# Patient Record
Sex: MALE | Race: WHITE | Employment: OTHER | URBAN - METROPOLITAN AREA
[De-identification: names, ages, dates, MRNs, and addresses within clinical notes are randomized per-mention and may not be internally consistent; named-entity substitution may affect disease eponyms.]

---

## 2017-09-18 DIAGNOSIS — I10 ESSENTIAL HYPERTENSION WITH GOAL BLOOD PRESSURE LESS THAN 140/90: ICD-10-CM

## 2017-09-18 NOTE — TELEPHONE ENCOUNTER
Clonidine      Last Written Prescription Date: 8/9/16  Last Fill Quantity: 180, # refills: 3  Last Office Visit with [facility], [facility] or [facility] prescribing provider: 8/9/16       Potassium   Date Value Ref Range Status   08/09/2016 4.2 3.4 - 5.3 mmol/L Final     Creatinine   Date Value Ref Range Status   08/09/2016 1.32 (H) 0.66 - 1.25 mg/dL Final     BP Readings from Last 3 Encounters:   08/09/16 132/84   09/22/15 134/88   02/03/15 132/84         Metoprolol      Last Written Prescription Date: 8/9/16  Last Fill Quantity: 180, # refills: 2    Last Office Visit with [facility], [facility] or [facility] prescribing provider:  8/9/16   Future Office Visit:        BP Readings from Last 3 Encounters:   08/09/16 132/84   09/22/15 134/88   02/03/15 132/84

## 2017-09-19 RX ORDER — CLONIDINE HYDROCHLORIDE 0.2 MG/1
TABLET ORAL
Qty: 60 TABLET | Refills: 0 | Status: SHIPPED | OUTPATIENT
Start: 2017-09-19 | End: 2017-11-01

## 2017-09-19 RX ORDER — METOPROLOL TARTRATE 100 MG
TABLET ORAL
Qty: 60 TABLET | Refills: 0 | Status: SHIPPED | OUTPATIENT
Start: 2017-09-19 | End: 2017-11-01

## 2017-09-19 NOTE — TELEPHONE ENCOUNTER
Prescription approved per [facility] Refill Protocol.    Doris Hernandez RN  [facility]

## 2017-10-06 ENCOUNTER — TELEPHONE (OUTPATIENT)
Dept: FAMILY MEDICINE | Facility: CLINIC | Age: 62
End: 2017-10-06

## 2017-10-27 DIAGNOSIS — N18.2 TYPE 2 DIABETES MELLITUS WITH STAGE 2 CHRONIC KIDNEY DISEASE (H): ICD-10-CM

## 2017-10-27 DIAGNOSIS — E11.22 TYPE 2 DIABETES MELLITUS WITH STAGE 2 CHRONIC KIDNEY DISEASE (H): ICD-10-CM

## 2017-10-27 NOTE — TELEPHONE ENCOUNTER
GlyBURIDE-MetFORMIN Oral Tablet 2.5-500 MG        Last Written Prescription Date:  8/9/16  Last Fill Quantity: 360,   # refills: 3  Future Office visit:    Next 5 appointments (look out 90 days)     Nov 09, 2017 10:00 AM CST   PHYSICAL with Vijay Villanueva MD   [facility] ([facility])    [address] [place] [id number] [phone]                   Routing refill request to provider for review/approval because:  Does not meet protocol criteria  LOV: 8/9/16

## 2017-10-30 RX ORDER — GLYBURIDE-METFORMIN HYDROCHLORIDE 2.5; 5 MG/1; MG/1
TABLET ORAL
Qty: 60 TABLET | Refills: 0 | Status: SHIPPED | OUTPATIENT
Start: 2017-10-30 | End: 2017-11-09

## 2017-10-30 NOTE — TELEPHONE ENCOUNTER
Medication is being filled for 1 time refill only due to:  Patient needs to be seen because it has been more than one year since last visit.     MAGALI JulienN RN  [facility]

## 2017-11-01 DIAGNOSIS — I10 ESSENTIAL HYPERTENSION WITH GOAL BLOOD PRESSURE LESS THAN 140/90: ICD-10-CM

## 2017-11-01 NOTE — TELEPHONE ENCOUNTER
Metoprolol Tartrate Oral Tablet 100 MG        Last Written Prescription Date:  9/19/17  Last Fill Quantity: 60,   # refills: 0  Future Office visit:    Next 5 appointments (look out 90 days)     Nov 09, 2017 10:00 AM CST   PHYSICAL with Vijay Villanueva MD   [facility]    [address] [place] [id number] [phone]                   Routing refill request to provider for review/approval because:  Does not meet protocol criteria  LOV: 8/9/16

## 2017-11-01 NOTE — TELEPHONE ENCOUNTER
Clonidine      Last Written Prescription Date:  9/19/17  Last Fill Quantity: 60,   # refills: 0  Future Office visit:    Next 5 appointments (look out 90 days)     Nov 09, 2017 10:00 AM CST   PHYSICAL with Vijay Villanueva MD   [facility] ([facility])    [address] [place] [id number] [phone]                   Routing refill request to provider for review/approval because:  Does not meet protocol criteria  LOV: 8/9/16        Valsartan      Last Written Prescription Date:  8/19/16  Last Fill Quantity: 180,   # refills: 3  Future Office visit:    Next 5 appointments (look out 90 days)     Nov 09, 2017 10:00 AM CST   PHYSICAL with Vijay Villanueva MD   [facility] ([facility])    [address] [place] [id number] [phone]                   Routing refill request to provider for review/approval because:  Does not meet protocol criteria  LOV: 8/9/16

## 2017-11-03 RX ORDER — CLONIDINE HYDROCHLORIDE 0.2 MG/1
0.2 TABLET ORAL 2 TIMES DAILY
Qty: 60 TABLET | Refills: 0 | Status: SHIPPED | OUTPATIENT
Start: 2017-11-03 | End: 2017-11-09

## 2017-11-03 RX ORDER — METOPROLOL TARTRATE 100 MG
TABLET ORAL
Qty: 60 TABLET | Refills: 0 | Status: SHIPPED | OUTPATIENT
Start: 2017-11-03 | End: 2017-11-09

## 2017-11-03 RX ORDER — VALSARTAN AND HYDROCHLOROTHIAZIDE 160; 12.5 MG/1; MG/1
TABLET, FILM COATED ORAL
Qty: 180 TABLET | Refills: 0 | Status: SHIPPED | OUTPATIENT
Start: 2017-11-03 | End: 2017-11-09

## 2017-11-03 NOTE — TELEPHONE ENCOUNTER
Prescription approved per [facility] Refill Protocol.    Liza Han RN   [facility]

## 2017-11-03 NOTE — TELEPHONE ENCOUNTER
Prescription approved per [facility] Refill Protocol.    Liza Han RN   [facility]

## 2017-11-08 NOTE — PROGRESS NOTES
SUBJECTIVE:   CC: Juan Castillo Jr. is an 62 year old male who presents for preventative health visit.     Healthy Habits:    Do you get at least three servings of calcium containing foods daily (dairy, green leafy vegetables, etc.)? yes    Amount of exercise or daily activities, outside of work: 3 day(s) per week    Problems taking medications regularly No    Medication side effects: No    Have you had an eye exam in the past two years? yes    Do you see a dentist twice per year? No 1x    Do you have sleep apnea, excessive snoring or daytime drowsiness?no          Diabetes Follow-up      Patient is checking blood sugars: normal    Diabetic concerns: None     Symptoms of hypoglycemia (low blood sugar): none     Paresthesias (numbness or burning in feet) or sores: No     Date of last diabetic eye exam: fall 2016    Hyperlipidemia Follow-Up      Rate your low fat/cholesterol diet?: good    Taking statin?  Yes, no muscle aches from statin    Other lipid medications/supplements?:  none    Hypertension Follow-up      Outpatient blood pressures are not being checked.    Low Salt Diet: no added salt          Today's PHQ-2 Score: PHQ-2 ( 1999 Pfizer) 11/9/2017 8/9/2016   Q1: Little interest or pleasure in doing things 0 0   Q2: Feeling down, depressed or hopeless 0 0   PHQ-2 Score 0 0       Abuse: Current or Past(Physical, Sexual or Emotional)- No  Do you feel safe in your environment - Yes    Social History   Substance Use Topics     Smoking status: Never Smoker     Smokeless tobacco: Never Used     Alcohol use No     The patient does not drink >3 drinks per day nor >7 drinks per week.    Last PSA:   PSA   Date Value Ref Range Status   07/19/2012 1.69 0 - 4 ug/L Final       Reviewed orders with patient. Reviewed health maintenance and updated orders accordingly - Yes  Labs reviewed in EPIC    Reviewed and updated as needed this visit by clinical staff  Allergies  Meds         Reviewed and updated as needed this visit  by Provider        Past Medical History:   Diagnosis Date     Backache, unspecified      Closed fracture of rib(s), unspecified      Essential hypertension, benign      Sebaceous cyst      Type II or unspecified type diabetes mellitus without mention of complication, not stated as uncontrolled      Unspecified sinusitis (chronic)         ROS:  C: NEGATIVE for fever, chills, change in weight  I: NEGATIVE for worrisome rashes, moles or lesions  E: NEGATIVE for vision changes or irritation  ENT: NEGATIVE for ear, mouth and throat problems  R: NEGATIVE for significant cough or SOB  CV: NEGATIVE for chest pain, palpitations or peripheral edema  GI: NEGATIVE for nausea, abdominal pain, heartburn, or change in bowel habits   male: negative for dysuria, hematuria, decreased urinary stream, erectile dysfunction, urethral discharge  MUSCULOSKELETAL:POSITIVE  for joint pain left ankle/ gout flare up  N: NEGATIVE for weakness, dizziness or paresthesias  P: NEGATIVE for changes in mood or affect    OBJECTIVE:   /66 (BP Location: Right arm, Patient Position: Chair, Cuff Size: Adult Large)  Pulse 87  Temp 96.8  F (36  C) (Oral)  Wt 249 lb 14.4 oz (113.4 kg)  SpO2 96%  BMI 35.35 kg/m2  EXAM:  GENERAL: alert, mild distress, over weight and fatigued  EYES: Eyes grossly normal to inspection, PERRL and conjunctivae and sclerae normal  HENT: ear canals and TM's normal, nose and mouth without ulcers or lesions  NECK: no adenopathy, no asymmetry, masses, or scars and thyroid normal to palpation  RESP: lungs clear to auscultation - no rales, rhonchi or wheezes  CV: regular rate and rhythm, normal S1 S2, no S3 or S4, no murmur, click or rub, no peripheral edema and peripheral pulses strong  ABDOMEN: soft, nontender, no hepatosplenomegaly, no masses and bowel sounds normal   (male): normal male genitalia without lesions or urethral discharge, no hernia  RECTAL (male): normal sphincter tone, no rectal masses and prostate 2+  enlarged, nontender  MS: normal muscle tone, arthritis of the left ankle and tenderness to palpation same  SKIN: no suspicious lesions or rashes  NEURO: Normal strength and tone, mentation intact and speech normal  LYMPH: no cervical, supraclavicular, axillary, or inguinal adenopathy  Diabetic foot exam: normal DP and PT pulses, no trophic changes or ulcerative lesions and normal sensory exam    ASSESSMENT/PLAN:   1. Routine general medical examination at a health care facility  Urged diet/ exercise    2. Essential hypertension with goal blood pressure less than 140/90  Well controlled   - Lipid panel reflex to direct LDL Fasting  - Albumin Random Urine Quantitative with Creat Ratio  - cloNIDine (CATAPRES) 0.2 MG tablet; Take 1 tablet (0.2 mg) by mouth 2 times daily  Dispense: 180 tablet; Refill: 3  - valsartan-hydrochlorothiazide (DIOVAN-HCT) 160-12.5 MG per tablet; Take 2 tablets by mouth once daily  Dispense: 180 tablet; Refill: 3  - metoprolol (LOPRESSOR) 100 MG tablet; TAKE ONE TABLET (100 mg) BY MOUTH TWICE DAILY  Dispense: 180 tablet; Refill: 3    3. Type 2 diabetes mellitus with stage 2 chronic kidney disease, without long-term current use of insulin (H)  Well controlled   - HEMOGLOBIN A1C  - Lipid panel reflex to direct LDL Fasting  - TSH WITH FREE T4 REFLEX  - glyBURIDE-metFORMIN (GLUCOVANCE) 2.5-500 MG per tablet; Take 2 tablets by mouth twice daily with meals  Dispense: 180 tablet; Refill: 3    4. Screen for colon cancer  Will do  - Fecal colorectal cancer screen FIT - Future (S+30); Future  - GASTROENTEROLOGY ADULT REF CONSULT ONLY    5. Need for hepatitis C screening test  Low risk  - Hepatitis C Screen Reflex to HCV RNA Quant and Genotype    6. Screening for diabetic peripheral neuropathy  normal   - FOOT EXAM  NO CHARGE [33280.114]    7. Need for influenza vaccination  done    8. Acute idiopathic gout, unspecified site  resume  - allopurinol (ZYLOPRIM) 100 MG tablet; Take 1.5 tablets (150 mg) by  "mouth daily  Dispense: 135 tablet; Refill: 3  - predniSONE (DELTASONE) 20 MG tablet; Take 1 tablet (20 mg) by mouth 2 times daily  Dispense: 10 tablet; Refill: 0    9. High cholesterol  recheck  - simvastatin (ZOCOR) 40 MG tablet; Take 1 tablet (40mg) by mouth at bedtime  Dispense: 90 tablet; Refill: 3    10. Need for prophylactic vaccination and inoculation against influenza  done  - FLU VAC, SPLIT VIRUS IM > 3 YO (QUADRIVALENT) [68654]  - Vaccine Administration, Initial [69448]    COUNSELING:  Reviewed preventive health counseling, as reflected in patient instructions       Vision screening       Hearing screening       Immunizations    Vaccinated for: Influenza           Consider Hep C screening for patients born between 1945 and 1965       Colon cancer screening       Prostate cancer screening           reports that he has never smoked. He has never used smokeless tobacco.      Estimated body mass index is 35.35 kg/(m^2) as calculated from the following:    Height as of 8/9/16: 5' 10.5\" (1.791 m).    Weight as of this encounter: 249 lb 14.4 oz (113.4 kg).   Weight management plan: Discussed healthy diet and exercise guidelines and patient will follow up in 12 months in clinic to re-evaluate.    Counseling Resources:  ATP IV Guidelines  Pooled Cohorts Equation Calculator  FRAX Risk Assessment  ICSI Preventive Guidelines  Dietary Guidelines for Americans, 2010  USDA's MyPlate  ASA Prophylaxis  Lung CA Screening    Vijay Villanueva MD  [facility]  Injectable Influenza Immunization Documentation    1.  Is the person to be vaccinated sick today?   No    2. Does the person to be vaccinated have an allergy to a component   of the vaccine?   No  Egg Allergy Algorithm Link    3. Has the person to be vaccinated ever had a serious reaction   to influenza vaccine in the past?   No    4. Has the person to be vaccinated ever had Guillain-Barré syndrome?   No    Form completed by Carolina Javier " CMA

## 2017-11-09 ENCOUNTER — TELEPHONE (OUTPATIENT)
Dept: FAMILY MEDICINE | Facility: CLINIC | Age: 62
End: 2017-11-09

## 2017-11-09 ENCOUNTER — OFFICE VISIT (OUTPATIENT)
Dept: FAMILY MEDICINE | Facility: CLINIC | Age: 62
End: 2017-11-09
Payer: COMMERCIAL

## 2017-11-09 VITALS
OXYGEN SATURATION: 96 % | BODY MASS INDEX: 35.35 KG/M2 | TEMPERATURE: 96.8 F | HEART RATE: 87 BPM | SYSTOLIC BLOOD PRESSURE: 100 MMHG | DIASTOLIC BLOOD PRESSURE: 66 MMHG | WEIGHT: 249.9 LBS

## 2017-11-09 DIAGNOSIS — Z23 NEED FOR PROPHYLACTIC VACCINATION AND INOCULATION AGAINST INFLUENZA: ICD-10-CM

## 2017-11-09 DIAGNOSIS — Z11.59 NEED FOR HEPATITIS C SCREENING TEST: ICD-10-CM

## 2017-11-09 DIAGNOSIS — E11.22 TYPE 2 DIABETES MELLITUS WITH STAGE 2 CHRONIC KIDNEY DISEASE, WITHOUT LONG-TERM CURRENT USE OF INSULIN (H): ICD-10-CM

## 2017-11-09 DIAGNOSIS — N18.2 TYPE 2 DIABETES MELLITUS WITH STAGE 2 CHRONIC KIDNEY DISEASE, WITHOUT LONG-TERM CURRENT USE OF INSULIN (H): ICD-10-CM

## 2017-11-09 DIAGNOSIS — Z12.11 SCREEN FOR COLON CANCER: ICD-10-CM

## 2017-11-09 DIAGNOSIS — I10 ESSENTIAL HYPERTENSION WITH GOAL BLOOD PRESSURE LESS THAN 140/90: ICD-10-CM

## 2017-11-09 DIAGNOSIS — E78.00 HIGH CHOLESTEROL: ICD-10-CM

## 2017-11-09 DIAGNOSIS — M10.00 ACUTE IDIOPATHIC GOUT, UNSPECIFIED SITE: ICD-10-CM

## 2017-11-09 DIAGNOSIS — Z23 NEED FOR INFLUENZA VACCINATION: ICD-10-CM

## 2017-11-09 DIAGNOSIS — Z00.00 ROUTINE GENERAL MEDICAL EXAMINATION AT A HEALTH CARE FACILITY: Primary | ICD-10-CM

## 2017-11-09 DIAGNOSIS — Z13.89 SCREENING FOR DIABETIC PERIPHERAL NEUROPATHY: ICD-10-CM

## 2017-11-09 LAB
CHOLEST SERPL-MCNC: 167 MG/DL
HBA1C MFR BLD: 7.9 % (ref 4.3–6)
HCV AB SERPL QL IA: NONREACTIVE
HDLC SERPL-MCNC: 28 MG/DL
LDLC SERPL CALC-MCNC: 93 MG/DL
NONHDLC SERPL-MCNC: 139 MG/DL
TRIGL SERPL-MCNC: 229 MG/DL
TSH SERPL DL<=0.005 MIU/L-ACNC: 2.68 MU/L (ref 0.4–4)

## 2017-11-09 PROCEDURE — 36415 COLL VENOUS BLD VENIPUNCTURE: CPT | Performed by: FAMILY MEDICINE

## 2017-11-09 PROCEDURE — 83036 HEMOGLOBIN GLYCOSYLATED A1C: CPT | Performed by: FAMILY MEDICINE

## 2017-11-09 PROCEDURE — 84443 ASSAY THYROID STIM HORMONE: CPT | Performed by: FAMILY MEDICINE

## 2017-11-09 PROCEDURE — 99396 PREV VISIT EST AGE 40-64: CPT | Mod: 25 | Performed by: FAMILY MEDICINE

## 2017-11-09 PROCEDURE — 90471 IMMUNIZATION ADMIN: CPT | Performed by: FAMILY MEDICINE

## 2017-11-09 PROCEDURE — 99207 C FOOT EXAM  NO CHARGE: CPT | Mod: 25 | Performed by: FAMILY MEDICINE

## 2017-11-09 PROCEDURE — 90686 IIV4 VACC NO PRSV 0.5 ML IM: CPT | Performed by: FAMILY MEDICINE

## 2017-11-09 PROCEDURE — 80061 LIPID PANEL: CPT | Performed by: FAMILY MEDICINE

## 2017-11-09 PROCEDURE — 86803 HEPATITIS C AB TEST: CPT | Performed by: FAMILY MEDICINE

## 2017-11-09 RX ORDER — GLYBURIDE-METFORMIN HYDROCHLORIDE 2.5; 5 MG/1; MG/1
TABLET ORAL
Qty: 180 TABLET | Refills: 3 | Status: SHIPPED | OUTPATIENT
Start: 2017-11-09 | End: 2017-11-09

## 2017-11-09 RX ORDER — PREDNISONE 20 MG/1
20 TABLET ORAL 2 TIMES DAILY
Qty: 10 TABLET | Refills: 0 | Status: SHIPPED | OUTPATIENT
Start: 2017-11-09 | End: 2018-12-20

## 2017-11-09 RX ORDER — SIMVASTATIN 40 MG
TABLET ORAL
Qty: 90 TABLET | Refills: 3 | Status: SHIPPED | OUTPATIENT
Start: 2017-11-09 | End: 2018-11-24

## 2017-11-09 RX ORDER — GLYBURIDE-METFORMIN HYDROCHLORIDE 2.5; 5 MG/1; MG/1
TABLET ORAL
Qty: 360 TABLET | Refills: 1 | Status: SHIPPED | OUTPATIENT
Start: 2017-11-09 | End: 2018-07-10

## 2017-11-09 RX ORDER — VALSARTAN AND HYDROCHLOROTHIAZIDE 160; 12.5 MG/1; MG/1
TABLET, FILM COATED ORAL
Qty: 180 TABLET | Refills: 3 | Status: SHIPPED | OUTPATIENT
Start: 2017-11-09 | End: 2019-01-17

## 2017-11-09 RX ORDER — METOPROLOL TARTRATE 100 MG
TABLET ORAL
Qty: 180 TABLET | Refills: 3 | Status: SHIPPED | OUTPATIENT
Start: 2017-11-09 | End: 2018-12-20

## 2017-11-09 RX ORDER — CLONIDINE HYDROCHLORIDE 0.2 MG/1
0.2 TABLET ORAL 2 TIMES DAILY
Qty: 180 TABLET | Refills: 3 | Status: SHIPPED | OUTPATIENT
Start: 2017-11-09 | End: 2018-11-24

## 2017-11-09 RX ORDER — ALLOPURINOL 100 MG/1
150 TABLET ORAL DAILY
Qty: 135 TABLET | Refills: 3 | Status: SHIPPED | OUTPATIENT
Start: 2017-11-09 | End: 2018-11-20

## 2017-11-09 NOTE — NURSING NOTE
"Chief Complaint   Patient presents with     Physical       Initial /66 (BP Location: Right arm, Patient Position: Chair, Cuff Size: Adult Large)  Pulse 87  Temp 96.8  F (36  C) (Oral)  Wt 249 lb 14.4 oz (113.4 kg)  SpO2 96%  BMI 35.35 kg/m2 Estimated body mass index is 35.35 kg/(m^2) as calculated from the following:    Height as of 8/9/16: 5' 10.5\" (1.791 m).    Weight as of this encounter: 249 lb 14.4 oz (113.4 kg).  Medication Reconciliation: complete     Carolina Javier CMA    "

## 2017-11-09 NOTE — MR AVS SNAPSHOT
After Visit Summary   11/9/2017    Juan Castillo Jr.    MRN: [id number]           Patient Information     Date Of Birth          1955        Visit Information        Provider Department      11/9/2017 10:00 AM Vijay Villanueva MD [facility]        Today's Diagnoses     Routine general medical examination at a health care facility    -  1    Essential hypertension with goal blood pressure less than 140/90        Type 2 diabetes mellitus with stage 2 chronic kidney disease, without long-term current use of insulin (H)        Screen for colon cancer        Need for hepatitis C screening test        Screening for diabetic peripheral neuropathy        Need for influenza vaccination        Acute idiopathic gout, unspecified site        High cholesterol        Need for prophylactic vaccination and inoculation against influenza          Care Instructions      Preventive Health Recommendations  Male Ages 50 - 64    Yearly exam:             See your health care provider every year in order to  o   Review health changes.   o   Discuss preventive care.    o   Review your medicines if your doctor has prescribed any.     Have a cholesterol test every 5 years, or more frequently if you are at risk for high cholesterol/heart disease.     Have a diabetes test (fasting glucose) every three years. If you are at risk for diabetes, you should have this test more often.     Have a colonoscopy at age 50, or have a yearly FIT test (stool test). These exams will check for colon cancer.      Talk with your health care provider about whether or not a prostate cancer screening test (PSA) is right for you.    You should be tested each year for STDs (sexually transmitted diseases), if you re at risk.     Shots: Get a flu shot each year. Get a tetanus shot every 10 years.     Nutrition:    Eat at least 5 servings of fruits and vegetables daily.     Eat whole-grain bread, whole-wheat pasta and brown  rice instead of white grains and rice.     Talk to your provider about Calcium and Vitamin D.     Lifestyle    Exercise for at least 150 minutes a week (30 minutes a day, 5 days a week). This will help you control your weight and prevent disease.     Limit alcohol to one drink per day.     No smoking.     Wear sunscreen to prevent skin cancer.     See your dentist every six months for an exam and cleaning.     See your eye doctor every 1 to 2 years.            Follow-ups after your visit        Additional Services     GASTROENTEROLOGY ADULT REF CONSULT ONLY       Preferred Location: [facility] [phone]      Please be aware that coverage of these services is subject to the terms and limitations of your health insurance plan.  Call member services at your health plan with any benefit or coverage questions.  Any procedures must be performed at a [place] facility OR coordinated by your clinic's referral office.    Please bring the following with you to your appointment:    (1) Any X-Rays, CTs or MRIs which have been performed.  Contact the facility where they were done to arrange for  prior to your scheduled appointment.    (2) List of current medications   (3) This referral request   (4) Any documents/labs given to you for this referral                  Future tests that were ordered for you today     Open Future Orders        Priority Expected Expires Ordered    Fecal colorectal cancer screen FIT - Future (S+30) Routine 11/29/2017 12/8/2017 11/9/2017            Who to contact     If you have questions or need follow up information about today's clinic visit or your schedule please contact [facility] directly at [phone].  Normal or non-critical lab and imaging results will be communicated to you by MyChart, letter or phone within 4 business days after the clinic has received the results. If you do not hear from us within 7 days, please contact the clinic through MyChart or phone. If you  "have a critical or abnormal lab result, we will notify you by phone as soon as possible.  Submit refill requests through [email or web address] or call your pharmacy and they will forward the refill request to us. Please allow 3 business days for your refill to be completed.          Additional Information About Your Visit        [email or web address] Information     [email or web address] lets you send messages to your doctor, view your test results, renew your prescriptions, schedule appointments and more. To sign up, go to [email or web address] . Click on \"Log in\" on the left side of the screen, which will take you to the Welcome page. Then click on \"Sign up Now\" on the right side of the page.     You will be asked to enter the access code listed below, as well as some personal information. Please follow the directions to create your username and password.     Your access code is: [email or web address]  Expires: 2018 12:25 PM     Your access code will  in 90 days. If you need help or a new code, please call your [place] clinic or [phone].        Care EveryWhere ID     This is your Care EveryWhere ID. This could be used by other organizations to access your [place] medical records  [id number]        Your Vitals Were     Pulse Temperature Pulse Oximetry BMI (Body Mass Index)          87 96.8  F (36  C) (Oral) 96% 35.35 kg/m2         Blood Pressure from Last 3 Encounters:   17 100/66   16 132/84   09/22/15 134/88    Weight from Last 3 Encounters:   17 249 lb 14.4 oz (113.4 kg)   16 246 lb 3.2 oz (111.7 kg)   09/22/15 271 lb (122.9 kg)              We Performed the Following     Albumin Random Urine Quantitative with Creat Ratio     FLU VAC, SPLIT VIRUS IM > 3 YO (QUADRIVALENT) [88674]     FOOT EXAM  NO CHARGE [66430.114]     GASTROENTEROLOGY ADULT REF CONSULT ONLY     HEMOGLOBIN A1C     Hepatitis C Screen Reflex to HCV RNA Quant and Genotype     Lipid panel reflex to direct LDL Fasting     TSH WITH FREE T4 REFLEX     " Vaccine Administration, Initial [91361]          Today's Medication Changes          These changes are accurate as of: 11/9/17 12:25 PM.  If you have any questions, ask your nurse or doctor.               Start taking these medicines.        Dose/Directions    predniSONE 20 MG tablet   Commonly known as:  DELTASONE   Used for:  Acute idiopathic gout, unspecified site   Started by:  Vijay Villanueva MD        Dose:  20 mg   Take 1 tablet (20 mg) by mouth 2 times daily   Quantity:  10 tablet   Refills:  0         These medicines have changed or have updated prescriptions.        Dose/Directions    glyBURIDE-metFORMIN 2.5-500 MG per tablet   Commonly known as:  GLUCOVANCE   This may have changed:  See the new instructions.   Used for:  Type 2 diabetes mellitus with stage 2 chronic kidney disease, without long-term current use of insulin (H)   Changed by:  Vijay Villanueva MD        Take 2 tablets by mouth twice daily with meals   Quantity:  180 tablet   Refills:  3       metoprolol 100 MG tablet   Commonly known as:  LOPRESSOR   This may have changed:  See the new instructions.   Used for:  Essential hypertension with goal blood pressure less than 140/90   Changed by:  Vijay Villanueva MD        TAKE ONE TABLET (100 mg) BY MOUTH TWICE DAILY   Quantity:  180 tablet   Refills:  3       simvastatin 40 MG tablet   Commonly known as:  ZOCOR   This may have changed:  See the new instructions.   Used for:  High cholesterol   Changed by:  Vijay Villanueva MD        Take 1 tablet (40mg) by mouth at bedtime   Quantity:  90 tablet   Refills:  3       valsartan-hydrochlorothiazide 160-12.5 MG per tablet   Commonly known as:  DIOVAN-HCT   This may have changed:  See the new instructions.   Used for:  Essential hypertension with goal blood pressure less than 140/90   Changed by:  Vijay Villanueva MD        Take 2 tablets by mouth once daily   Quantity:  180 tablet   Refills:  3             Where to get your medicines      These medications were sent to [id number] - [place], [place] - [address]  [address], [facility] [address]     Phone:  [phone]     allopurinol 100 MG tablet    cloNIDine 0.2 MG tablet    glyBURIDE-metFORMIN 2.5-500 MG per tablet    metoprolol 100 MG tablet    predniSONE 20 MG tablet    simvastatin 40 MG tablet    valsartan-hydrochlorothiazide 160-12.5 MG per tablet                Primary Care Provider Office Phone # Fax #    Vijay Villanueva [phone]       [address]  [facility] [address]        Equal Access to Services     CAITLYN BECERRA : Hadhayley delgadoo Sozhou, waaxda john, qaybta kaalmada adejaneen, robbi cummins. So [facility] [phone].    ATENCIÓN: Si habla español, tiene a rodgers disposición servicios gratuitos de asistencia lingüística. [facility] [phone].    We comply with applicable federal civil rights laws and [place] laws. We do not discriminate on the basis of race, color, national origin, age, disability, sex, sexual orientation, or gender identity.            Thank you!     Thank you for choosing [facility]  for your care. Our goal is always to provide you with excellent care. Hearing back from our patients is one way we can continue to improve our services. Please take a few minutes to complete the written survey that you may receive in the mail after your visit with us. Thank you!             Your Updated Medication List - Protect others around you: Learn how to safely use, store and throw away your medicines at www.disposemymeds.org.          This list is accurate as of: 11/9/17 12:25 PM.  Always use your most recent med list.                   Brand Name Dispense Instructions for use Diagnosis    allopurinol 100 MG tablet    ZYLOPRIM    135 tablet    Take 1.5 tablets (150 mg) by mouth daily    Acute idiopathic gout, unspecified site        aspirin 81 MG tablet      Take  by mouth daily.        blood glucose monitoring lancets     60 each    1 Device 2 times daily.    Type II or unspecified type diabetes mellitus without mention of complication, not stated as uncontrolled       blood glucose test strip     1 each    1 Device by In Vitro route 2 times daily.    Type II or unspecified type diabetes mellitus without mention of complication, not stated as uncontrolled       cloNIDine 0.2 MG tablet    CATAPRES    180 tablet    Take 1 tablet (0.2 mg) by mouth 2 times daily    Essential hypertension with goal blood pressure less than 140/90       DAILY MULTIVITAMIN PO       Hypertension goal BP (blood pressure) < 140/90, Type 2 diabetes, HbA1C goal < 8% (H), Hyperlipidemia LDL goal <100, Special screening for malignant neoplasms, colon       glyBURIDE-metFORMIN 2.5-500 MG per tablet    GLUCOVANCE    180 tablet    Take 2 tablets by mouth twice daily with meals    Type 2 diabetes mellitus with stage 2 chronic kidney disease, without long-term current use of insulin (H)       IRON SUPPLEMENT PO       Hypertension goal BP (blood pressure) < 140/90, Type 2 diabetes, HbA1C goal < 8% (H), Hyperlipidemia LDL goal <100, Special screening for malignant neoplasms, colon       metoprolol 100 MG tablet    LOPRESSOR    180 tablet    TAKE ONE TABLET (100 mg) BY MOUTH TWICE DAILY    Essential hypertension with goal blood pressure less than 140/90       predniSONE 20 MG tablet    DELTASONE    10 tablet    Take 1 tablet (20 mg) by mouth 2 times daily    Acute idiopathic gout, unspecified site       simvastatin 40 MG tablet    ZOCOR    90 tablet    Take 1 tablet (40mg) by mouth at bedtime    High cholesterol       terazosin 2 MG capsule    HYTRIN    270 capsule    1 tab in the AM and 2 tabs at HS.    Essential hypertension with goal blood pressure less than 140/90       valsartan-hydrochlorothiazide 160-12.5 MG per tablet    DIOVAN-HCT    180 tablet    Take 2 tablets by mouth  once daily    Essential hypertension with goal blood pressure less than 140/90

## 2017-11-09 NOTE — TELEPHONE ENCOUNTER
[facility] states the glyBURIDE-metFORMIN (GLUCOVANCE) 2.5-500 MG per tablet is prescribed for 45 days not 90 and is requesting a new prescription    Please contact pharmacy queued to resolve

## 2017-11-11 DIAGNOSIS — I10 ESSENTIAL HYPERTENSION WITH GOAL BLOOD PRESSURE LESS THAN 140/90: ICD-10-CM

## 2017-11-13 RX ORDER — TERAZOSIN 2 MG/1
CAPSULE ORAL
Qty: 270 CAPSULE | Refills: 2 | Status: SHIPPED | OUTPATIENT
Start: 2017-11-13 | End: 2018-12-20

## 2017-11-13 NOTE — TELEPHONE ENCOUNTER
Prescription approved per [facility] Ref[facility] Protocol.    Liza Han RN   [facility]

## 2018-07-10 DIAGNOSIS — E11.22 TYPE 2 DIABETES MELLITUS WITH STAGE 2 CHRONIC KIDNEY DISEASE, WITHOUT LONG-TERM CURRENT USE OF INSULIN (H): ICD-10-CM

## 2018-07-10 DIAGNOSIS — N18.2 TYPE 2 DIABETES MELLITUS WITH STAGE 2 CHRONIC KIDNEY DISEASE, WITHOUT LONG-TERM CURRENT USE OF INSULIN (H): ICD-10-CM

## 2018-07-10 RX ORDER — GLYBURIDE-METFORMIN HYDROCHLORIDE 2.5; 5 MG/1; MG/1
TABLET ORAL
Qty: 360 TABLET | Refills: 0 | Status: SHIPPED | OUTPATIENT
Start: 2018-07-10 | End: 2018-12-20

## 2018-07-10 NOTE — TELEPHONE ENCOUNTER
"Requested Prescriptions   Pending Prescriptions Disp Refills     glyBURIDE-metFORMIN (GLUCOVANCE) 2.5-500 MG per tablet [Pharmacy Med Name: GlyBURIDE-MetFORMIN Oral Tablet 2.5-500 MG] 360 tablet 0    Last Written Prescription Date:  11/09/2017  Last Fill Quantity: 360,  # refills: 1   Last office visit: 11/9/2017 with prescribing provider:  11/09/2017   Future Office Visit:     Sig: Take 2 tablets by mouth twice daily with meals    Combination Oral Antihyperglycemic Agents Failed    7/10/2018  7:52 AM       Failed - Patient has a documented Microalbumin level within past 12 mos.    Recent Labs   Lab Test  08/09/16   1027   MICROL  61   UMALCR  52.87*            Failed - Patient has documented A1c within the specified period of time.    If HgbA1C is 8 or greater, it needs to be on file within the past 3 months.  If less than 8, must be on file within the past 6 months.     Recent Labs   Lab Test  11/09/17   1038   A1C  7.9*            Failed - Recent (6 mo) or future (30 days) visit within the authorizing provider's specialty    Patient had office visit in the last 6 months or has a visit in the next 30 days with authorizing provider or within the authorizing provider's specialty.  See \"Patient Info\" tab in inbasket, or \"Choose Columns\" in Meds & Orders section of the refill encounter.           Passed - Blood pressure under 140/90 in past 12 months    BP Readings from Last 3 Encounters:   11/09/17 100/66   08/09/16 132/84   09/22/15 134/88                Passed - Patient has a documented LDL level within past 12 mos.    Recent Labs   Lab Test  11/09/17   1038   LDL  93            Passed - Patient's CR is NOT>1.4 OR Patient's EGFR is NOT<45 within past 12 mos.    Recent Labs   Lab Test  08/09/16   1027   GFRESTIMATED  55*   GFRESTBLACK  67       Recent Labs   Lab Test  08/09/16   1027   CR  1.32*            Passed - Patient does not have a diagnosis of CHF.       Passed - Patient is 18 years old or older.          "

## 2018-07-10 NOTE — TELEPHONE ENCOUNTER
Dr. Villanueva,    Please review/sign or advise for refill of glyburide-metformin (Glucovance) 2.5-500 mg tablet.    Routing because labs out of range for microalbumin/A1c.    Sherrie Tijerina RN  [facility]

## 2018-07-17 ENCOUNTER — TELEPHONE (OUTPATIENT)
Dept: FAMILY MEDICINE | Facility: CLINIC | Age: 63
End: 2018-07-17

## 2018-07-17 DIAGNOSIS — I10 ESSENTIAL HYPERTENSION WITH GOAL BLOOD PRESSURE LESS THAN 140/90: Primary | ICD-10-CM

## 2018-07-17 NOTE — TELEPHONE ENCOUNTER
Received fax from [facility]: They do not have Valsartan 160 mg tablet available due to recall. They are wondering if provider would like alternative prescription ordered. Fax at triage desk.    Sherrie Tijerina RN  [facility]

## 2018-07-18 RX ORDER — LOSARTAN POTASSIUM AND HYDROCHLOROTHIAZIDE 12.5; 1 MG/1; MG/1
1 TABLET ORAL DAILY
Qty: 90 TABLET | Refills: 1 | Status: SHIPPED | OUTPATIENT
Start: 2018-07-18 | End: 2018-12-20

## 2018-07-18 NOTE — TELEPHONE ENCOUNTER
Switch to   Orders Placed This Encounter     losartan-hydrochlorothiazide (HYZAAR) 100-12.5 MG per tablet     Sig: Take 1 tablet by mouth daily     Dispense:  90 tablet     Refill:  1

## 2018-07-18 NOTE — TELEPHONE ENCOUNTER
Dr. Villanueva    Pt valsartan is on recall, please send an alternative if needed    Pharm cued    Liza Han RN   [facility]

## 2018-07-18 NOTE — TELEPHONE ENCOUNTER
Unable to contact pt, does not have a working number    Liza Han RN   [facility]

## 2018-08-02 ENCOUNTER — TELEPHONE (OUTPATIENT)
Dept: FAMILY MEDICINE | Facility: CLINIC | Age: 63
End: 2018-08-02

## 2018-08-02 DIAGNOSIS — I10 ESSENTIAL HYPERTENSION WITH GOAL BLOOD PRESSURE LESS THAN 140/90: Primary | ICD-10-CM

## 2018-08-02 RX ORDER — CARVEDILOL 25 MG/1
25 TABLET ORAL 2 TIMES DAILY WITH MEALS
Qty: 60 TABLET | Refills: 0 | Status: SHIPPED | OUTPATIENT
Start: 2018-08-02 | End: 2018-09-23

## 2018-08-02 NOTE — TELEPHONE ENCOUNTER
Called patient. Notified of prescription sent and provider message. Pt verbalized understanding.    Sherrie Tijerina RN  [facility]

## 2018-08-02 NOTE — TELEPHONE ENCOUNTER
Please call patient  I can ok a 1 month supply but I absolutely have to see him within a month or will not be able to refill anything as it would be medically inappropriate to do so

## 2018-08-02 NOTE — TELEPHONE ENCOUNTER
Pharmacy is sending in a medication request for Carvedilol 25 mg tablet, take 1 tab by mouth twice daily for high blood pressure    Medication not on pt's medication list at all as a current med, inactive med, or discontinued med.

## 2018-08-02 NOTE — TELEPHONE ENCOUNTER
Dr. Villanueva,    Patient is requesting a refill of carvedilol (Coreg) 25 mg tablet. BID    Last filled through Dr. Elier Curry at [facility]. Encounter date: 06/10/18-06/12-18. Indication: High blood pressure. Pt declined scheduling follow up, Stated that he has family issues going on right now with his father.     Home phone number: [phone]    Sherrie Tijerina RN  [facility]

## 2018-09-23 DIAGNOSIS — I10 ESSENTIAL HYPERTENSION WITH GOAL BLOOD PRESSURE LESS THAN 140/90: ICD-10-CM

## 2018-09-24 RX ORDER — CARVEDILOL 25 MG/1
TABLET ORAL
Qty: 30 TABLET | Refills: 0 | Status: SHIPPED | OUTPATIENT
Start: 2018-09-24 | End: 2018-10-22

## 2018-09-24 NOTE — TELEPHONE ENCOUNTER
Pt needs to make appointment for further refills  Dayana fill given    Liza Han RN   [facility]

## 2018-09-24 NOTE — TELEPHONE ENCOUNTER
"Requested Prescriptions   Pending Prescriptions Disp Refills     carvedilol (COREG) 25 MG tablet [Pharmacy Med Name: Carvedilol Oral Tablet 25 MG] 60 tablet 0    Last Written Prescription Date:  08/02/2018  Last Fill Quantity: 60,  # refills: 0   Last office visit: 11/9/2017 with prescribing provider:  11/09/2017   Future Office Visit:   Sig: Take 1 tablet (25 mg) by mouth twice daily (with meals)    Beta-Blockers Protocol Passed    9/23/2018  8:34 AM       Passed - Blood pressure under 140/90 in past 12 months    BP Readings from Last 3 Encounters:   11/09/17 100/66   08/09/16 132/84   09/22/15 134/88                Passed - Patient is age 6 or older       Passed - Recent (12 mo) or future (30 days) visit within the authorizing provider's specialty    Patient had office visit in the last 12 months or has a visit in the next 30 days with authorizing provider or within the authorizing provider's specialty.  See \"Patient Info\" tab in inbasket, or \"Choose Columns\" in Meds & Orders section of the refill encounter.            "

## 2018-09-24 NOTE — TELEPHONE ENCOUNTER
Routing refill request to provider for review/approval because:  You advised the following at last refill - patient has not follow up - no future appointments - called patient -unable to reach - left message advising - please schedule office visit to discuss refills of this medication    Vijay Villanueva MD        8/2/18 10:58 AM   Note      Please call patient  I can ok a 1 month supply but I absolutely have to see him within a month or will not be able to refill anything as it would be medically inappropriate to do so            Thank you,  Frances Tadeo RN

## 2018-10-01 ENCOUNTER — TRANSFERRED RECORDS (OUTPATIENT)
Dept: HEALTH INFORMATION MANAGEMENT | Facility: CLINIC | Age: 63
End: 2018-10-01

## 2018-10-22 DIAGNOSIS — I10 ESSENTIAL HYPERTENSION WITH GOAL BLOOD PRESSURE LESS THAN 140/90: ICD-10-CM

## 2018-10-22 RX ORDER — CARVEDILOL 25 MG/1
TABLET ORAL
Qty: 60 TABLET | Refills: 0 | Status: SHIPPED | OUTPATIENT
Start: 2018-10-22 | End: 2018-12-20

## 2018-10-22 NOTE — TELEPHONE ENCOUNTER
Reason for call:  Medication   If this is a refill request, has the caller requested the refill from the pharmacy already? Yes  Will the patient be using a [place] [facility]? No  Name of the pharmacy and phone number for the current request: [facility] ([phone])    Name of the medication requested: Carvedilol    Other request:     Phone number to reach patient:  [phone]         Best Time:  Anytime    Can we leave a detailed message on this number?  YES

## 2018-10-22 NOTE — TELEPHONE ENCOUNTER
Called patient. LVM to call clinic. Needs appt. Per TE 08/02/18 pt to f/u with PCP before further refills. Dayana given on 09/24/18.    Sherrie Tijerina RN  [facility]

## 2018-10-22 NOTE — TELEPHONE ENCOUNTER
Dr. Villanueva/Provider Pool:    Pt called clinic and stated that he needs a prescription sent to [facility] pharmacy for carvedilol.     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 Pt stated that he will be there for 3 more weeks and there is no provider there that can prescribe any prescriptions for him.    Medication/pharmacy cued. Please review/sign or advise.    Sherrie Tijerina RN  [facility]

## 2018-11-20 DIAGNOSIS — M10.00 ACUTE IDIOPATHIC GOUT, UNSPECIFIED SITE: ICD-10-CM

## 2018-11-20 NOTE — TELEPHONE ENCOUNTER
Called patient. Due for office visit and updated blood work  LOV: 11/09/2017    Cari Lester RN  Triage Nurse

## 2018-11-20 NOTE — TELEPHONE ENCOUNTER
"Requested Prescriptions   Pending Prescriptions Disp Refills     allopurinol (ZYLOPRIM) 100 MG tablet [Pharmacy Med Name: Allopurinol Oral Tablet 100 MG] 135 tablet 2    Last Written Prescription Date:  11/09/17  Last Fill Quantity: 135,  # refills: 3   Last office visit: 11/9/2017 with prescribing provider:  11/09/17   Future Office Visit:     Sig: TAKE 1 AND 1/2 TABLETS (150 MG) BY MOUTH DAILY    Gout Agents Protocol Failed    11/20/2018 12:53 PM       Failed - CBC on file in past 12 months    Recent Labs   Lab Test  09/22/14   1002   WBC  6.5   RBC  3.71*   HGB  11.7*   HCT  36.1*   PLT  165                Failed - ALT on file in past 12 months    Recent Labs   Lab Test  08/09/16   1027   ALT  25            Failed - Has Uric Acid on file in past 12 months and value is less than 6    No lab results found.  If level is 6mg/dL or greater, ok to refill one time and refer to provider.          Failed - Recent (12 mo) or future (30 days) visit within the authorizing provider's specialty    Patient had office visit in the last 12 months or has a visit in the next 30 days with authorizing provider or within the authorizing provider's specialty.  See \"Patient Info\" tab in inbasket, or \"Choose Columns\" in Meds & Orders section of the refill encounter.             Failed - Normal serum creatinine on file in the past 12 months    Recent Labs   Lab Test  08/09/16   1027   CR  1.32*            Passed - Patient is age 18 or older          "

## 2018-11-21 RX ORDER — ALLOPURINOL 100 MG/1
TABLET ORAL
Qty: 20 TABLET | Refills: 0 | Status: SHIPPED | OUTPATIENT
Start: 2018-11-21 | End: 2019-01-16

## 2018-11-21 NOTE — TELEPHONE ENCOUNTER
To covering provider.    Overdue for labs,    Can you sign for 30 days or does he need to be seen first?    Danyell Tarango, RN, BSN

## 2018-11-24 DIAGNOSIS — E78.00 HIGH CHOLESTEROL: ICD-10-CM

## 2018-11-24 DIAGNOSIS — I10 ESSENTIAL HYPERTENSION WITH GOAL BLOOD PRESSURE LESS THAN 140/90: ICD-10-CM

## 2018-11-26 NOTE — TELEPHONE ENCOUNTER
Called patient. Left voicemail to return call to clinic.    LOV: 11/09/2017    Cari Lester, ESMER  Triage Nurse

## 2018-11-27 NOTE — TELEPHONE ENCOUNTER
"Requested Prescriptions   Pending Prescriptions Disp Refills     carvedilol (COREG) 25 MG tablet [Pharmacy Med Name: Carvedilol Oral Tablet 25 MG] 30 tablet 0     Sig: TAKE ONE TABLET BY MOUTH TWICE DAILY with meals - need MD appt-    Beta-Blockers Protocol Failed    11/26/2018 12:36 PM       Failed - Blood pressure under 140/90 in past 12 months    BP Readings from Last 3 Encounters:   11/09/17 100/66 08/09/16 132/84   09/22/15 134/88                Failed - Recent (12 mo) or future (30 days) visit within the authorizing provider's specialty    Patient had office visit in the last 12 months or has a visit in the next 30 days with authorizing provider or within the authorizing provider's specialty.  See \"Patient Info\" tab in inbasket, or \"Choose Columns\" in Meds & Orders section of the refill encounter.             Passed - Patient is age 6 or older        cloNIDine (CATAPRES) 0.2 MG tablet [Pharmacy Med Name: CloNIDine HCl Oral Tablet 0.2 MG] 180 tablet 2     Sig: TAKE ONE TABLET (0.2 MG) BY MOUTH TWICE DAILY    Central Acting Antiadrenergic Agents Failed    11/26/2018 12:36 PM       Failed - Blood pressure under 140/90 in past 12 months    BP Readings from Last 3 Encounters:   11/09/17 100/66 08/09/16 132/84   09/22/15 134/88                Failed - Recent (12 mo) or future (30 days) visit within the authorizing provider's specialty    Patient had office visit in the last 12 months or has a visit in the next 30 days with authorizing provider or within the authorizing provider's specialty.  See \"Patient Info\" tab in inbasket, or \"Choose Columns\" in Meds & Orders section of the refill encounter.             Failed - Normal serum creatinine on file within past 12 months    Recent Labs   Lab Test  08/09/16   1027   CR  1.32*            Passed - Patient is 6 years of age or older        simvastatin (ZOCOR) 40 MG tablet [Pharmacy Med Name: Simvastatin Oral Tablet 40 MG] 90 tablet 2     Sig: TAKE 1 TABLET (40 mg) BY " "MOUTH EVERY NIGHT AT BEDTIME.    Statins Protocol Failed    11/26/2018 12:36 PM       Failed - LDL on file in past 12 months    Recent Labs   Lab Test  11/09/17   1038   LDL  93            Failed - Recent (12 mo) or future (30 days) visit within the authorizing provider's specialty    Patient had office visit in the last 12 months or has a visit in the next 30 days with authorizing provider or within the authorizing provider's specialty.  See \"Patient Info\" tab in inbasket, or \"Choose Columns\" in Meds & Orders section of the refill encounter.             Passed - No abnormal creatine kinase in past 12 months    No lab results found.            Passed - Patient is age 18 or older        "

## 2018-11-28 ENCOUNTER — TELEPHONE (OUTPATIENT)
Dept: FAMILY MEDICINE | Facility: CLINIC | Age: 63
End: 2018-11-28

## 2018-11-28 DIAGNOSIS — I10 ESSENTIAL HYPERTENSION WITH GOAL BLOOD PRESSURE LESS THAN 140/90: ICD-10-CM

## 2018-11-28 RX ORDER — SIMVASTATIN 40 MG
TABLET ORAL
Qty: 30 TABLET | Refills: 0 | Status: SHIPPED | OUTPATIENT
Start: 2018-11-28 | End: 2018-12-20

## 2018-11-28 RX ORDER — CARVEDILOL 25 MG/1
TABLET ORAL
Qty: 30 TABLET | Refills: 0 | Status: SHIPPED | OUTPATIENT
Start: 2018-11-28 | End: 2018-11-28

## 2018-11-28 RX ORDER — CARVEDILOL 25 MG/1
TABLET ORAL
Qty: 60 TABLET | Refills: 0 | Status: SHIPPED | OUTPATIENT
Start: 2018-11-28 | End: 2018-12-20

## 2018-11-28 RX ORDER — CLONIDINE HYDROCHLORIDE 0.2 MG/1
TABLET ORAL
Qty: 60 TABLET | Refills: 0 | Status: SHIPPED | OUTPATIENT
Start: 2018-11-28 | End: 2018-12-20

## 2018-11-28 NOTE — TELEPHONE ENCOUNTER
Per chart review, pt has appt scheduled with Kay for 12/07/18.    Medication is being filled for 1 time refill only due to:  Patient needs to be seen because due for appointment.     Sherrie Tijerina RN  [facility]

## 2018-11-28 NOTE — TELEPHONE ENCOUNTER
Medication is being filled for 1 time refill only due to:  Patient needs to be seen because due for appt.    Sherrie Tijerina RN  [facility]

## 2018-11-28 NOTE — TELEPHONE ENCOUNTER
carvedilol (COREG) 25 MG tablet is prescribed with twice a day and only 30 tabs for a months quantity, pharmacy requesting new prescription with corrected orders or quanity.    Pharmacy queued

## 2018-12-10 DIAGNOSIS — N18.2 TYPE 2 DIABETES MELLITUS WITH STAGE 2 CHRONIC KIDNEY DISEASE, WITHOUT LONG-TERM CURRENT USE OF INSULIN (H): ICD-10-CM

## 2018-12-10 DIAGNOSIS — E11.22 TYPE 2 DIABETES MELLITUS WITH STAGE 2 CHRONIC KIDNEY DISEASE, WITHOUT LONG-TERM CURRENT USE OF INSULIN (H): ICD-10-CM

## 2018-12-11 NOTE — TELEPHONE ENCOUNTER
"Requested Prescriptions   Pending Prescriptions Disp Refills     glyBURIDE-metFORMIN (GLUCOVANCE) 2.5-500 MG tablet [Pharmacy Med Name: GlyBURIDE-MetFORMIN Oral Tablet 2.5-500 MG] 360 tablet 0     Sig: Take 2 tablets by mouth twice daily with meals    Combination Oral Antihyperglycemic Agents Failed - 12/10/2018  3:52 PM       Failed - Blood pressure under 140/90 in past 12 months    BP Readings from Last 3 Encounters:   11/09/17 100/66   08/09/16 132/84   09/22/15 134/88                Failed - Patient has a documented LDL level within past 12 mos.    Recent Labs   Lab Test 11/09/17  1038   LDL 93            Failed - Patient has a documented Microalbumin level within past 12 mos.    Recent Labs   Lab Test 08/09/16  1027   MICROL 61   UMALCR 52.87*            Failed - Patient has documented A1c within the specified period of time.    If HgbA1C is 8 or greater, it needs to be on file within the past 3 months.  If less than 8, must be on file within the past 6 months.     Recent Labs   Lab Test 11/09/17  1038   A1C 7.9*            Failed - Recent (6 mo) or future (30 days) visit within the authorizing provider's specialty    Patient had office visit in the last 6 months or has a visit in the next 30 days with authorizing provider or within the authorizing provider's specialty.  See \"Patient Info\" tab in inbasket, or \"Choose Columns\" in Meds & Orders section of the refill encounter.           Passed - Patient's CR is NOT>1.4 OR Patient's EGFR is NOT<45 within past 12 mos.    Recent Labs   Lab Test 08/09/16  1027   GFRESTIMATED 55*   GFRESTBLACK 67       Recent Labs   Lab Test 08/09/16  1027   CR 1.32*            Passed - Patient does not have a diagnosis of CHF.       Passed - Patient is 18 years old or older.        "

## 2018-12-11 NOTE — TELEPHONE ENCOUNTER
Called patient. LVM to call clinic. Pt was a no show to LOV 12/07/18. Pt told that he needed to be seen within 10 days from refill encounter 11/20/18.    Sherrie Tijerina RN  [facility]

## 2018-12-13 RX ORDER — GLYBURIDE-METFORMIN HYDROCHLORIDE 2.5; 5 MG/1; MG/1
TABLET ORAL
Qty: 360 TABLET | Refills: 0 | OUTPATIENT
Start: 2018-12-13

## 2018-12-13 NOTE — TELEPHONE ENCOUNTER
Dr. Villanueva,    Please review/sign or advise for refill request of glyBURIDE-metFORMIN (GLUCOVANCE) 2.5-500 MG tablet    Routing refill request to provider for review/approval because:  Last refill encounter, 11/20/2018 patient instructed to be seen within 10 days. Patient was a no show to scheduled appointment.     Called patient. Notified of need for appointment. Appointment scheduled 12/20/2018.     Thank You!  Cari Lester, RN  Triage Nurse

## 2018-12-20 ENCOUNTER — OFFICE VISIT (OUTPATIENT)
Dept: FAMILY MEDICINE | Facility: CLINIC | Age: 63
End: 2018-12-20
Payer: COMMERCIAL

## 2018-12-20 VITALS
DIASTOLIC BLOOD PRESSURE: 87 MMHG | OXYGEN SATURATION: 95 % | HEART RATE: 116 BPM | TEMPERATURE: 98.7 F | WEIGHT: 244.7 LBS | SYSTOLIC BLOOD PRESSURE: 136 MMHG | BODY MASS INDEX: 34.61 KG/M2

## 2018-12-20 DIAGNOSIS — N18.2 TYPE 2 DIABETES MELLITUS WITH STAGE 2 CHRONIC KIDNEY DISEASE, WITHOUT LONG-TERM CURRENT USE OF INSULIN (H): Primary | ICD-10-CM

## 2018-12-20 DIAGNOSIS — Z13.89 SCREENING FOR DIABETIC PERIPHERAL NEUROPATHY: ICD-10-CM

## 2018-12-20 DIAGNOSIS — E11.22 TYPE 2 DIABETES MELLITUS WITH STAGE 2 CHRONIC KIDNEY DISEASE, WITHOUT LONG-TERM CURRENT USE OF INSULIN (H): Primary | ICD-10-CM

## 2018-12-20 DIAGNOSIS — M10.00 IDIOPATHIC GOUT, UNSPECIFIED CHRONICITY, UNSPECIFIED SITE: ICD-10-CM

## 2018-12-20 DIAGNOSIS — I10 ESSENTIAL HYPERTENSION WITH GOAL BLOOD PRESSURE LESS THAN 140/90: ICD-10-CM

## 2018-12-20 DIAGNOSIS — E78.00 HIGH CHOLESTEROL: ICD-10-CM

## 2018-12-20 DIAGNOSIS — Z11.4 ENCOUNTER FOR SCREENING FOR HIV: ICD-10-CM

## 2018-12-20 DIAGNOSIS — Z23 NEED FOR IMMUNIZATION AGAINST INFLUENZA: ICD-10-CM

## 2018-12-20 LAB — HBA1C MFR BLD: 12.6 % (ref 0–5.6)

## 2018-12-20 PROCEDURE — 87389 HIV-1 AG W/HIV-1&-2 AB AG IA: CPT | Performed by: FAMILY MEDICINE

## 2018-12-20 PROCEDURE — 90471 IMMUNIZATION ADMIN: CPT | Performed by: FAMILY MEDICINE

## 2018-12-20 PROCEDURE — 80053 COMPREHEN METABOLIC PANEL: CPT | Performed by: FAMILY MEDICINE

## 2018-12-20 PROCEDURE — 90686 IIV4 VACC NO PRSV 0.5 ML IM: CPT | Performed by: FAMILY MEDICINE

## 2018-12-20 PROCEDURE — 80061 LIPID PANEL: CPT | Performed by: FAMILY MEDICINE

## 2018-12-20 PROCEDURE — 99214 OFFICE O/P EST MOD 30 MIN: CPT | Mod: 25 | Performed by: FAMILY MEDICINE

## 2018-12-20 PROCEDURE — 84550 ASSAY OF BLOOD/URIC ACID: CPT | Performed by: FAMILY MEDICINE

## 2018-12-20 PROCEDURE — 36415 COLL VENOUS BLD VENIPUNCTURE: CPT | Performed by: FAMILY MEDICINE

## 2018-12-20 PROCEDURE — 83036 HEMOGLOBIN GLYCOSYLATED A1C: CPT | Performed by: FAMILY MEDICINE

## 2018-12-20 PROCEDURE — 99207 C FOOT EXAM  NO CHARGE: CPT | Performed by: FAMILY MEDICINE

## 2018-12-20 RX ORDER — CLONIDINE HYDROCHLORIDE 0.2 MG/1
TABLET ORAL
Qty: 180 TABLET | Refills: 3 | Status: SHIPPED | OUTPATIENT
Start: 2018-12-20

## 2018-12-20 RX ORDER — SIMVASTATIN 40 MG
TABLET ORAL
Qty: 90 TABLET | Refills: 3 | Status: SHIPPED | OUTPATIENT
Start: 2018-12-20

## 2018-12-20 RX ORDER — LOSARTAN POTASSIUM AND HYDROCHLOROTHIAZIDE 12.5; 1 MG/1; MG/1
1 TABLET ORAL DAILY
Qty: 90 TABLET | Refills: 3 | Status: SHIPPED | OUTPATIENT
Start: 2018-12-20

## 2018-12-20 RX ORDER — GLYBURIDE-METFORMIN HYDROCHLORIDE 2.5; 5 MG/1; MG/1
TABLET ORAL
Qty: 360 TABLET | Refills: 3 | Status: SHIPPED | OUTPATIENT
Start: 2018-12-20 | End: 2019-01-17 | Stop reason: ALTCHOICE

## 2018-12-20 RX ORDER — CARVEDILOL 25 MG/1
TABLET ORAL
Qty: 180 TABLET | Refills: 3 | Status: SHIPPED | OUTPATIENT
Start: 2018-12-20

## 2018-12-20 RX ORDER — TERAZOSIN 2 MG/1
CAPSULE ORAL
Qty: 270 CAPSULE | Refills: 2 | Status: SHIPPED | OUTPATIENT
Start: 2018-12-20 | End: 2019-01-17

## 2018-12-20 RX ORDER — METOPROLOL TARTRATE 100 MG
TABLET ORAL
Qty: 180 TABLET | Refills: 3 | Status: SHIPPED | OUTPATIENT
Start: 2018-12-20 | End: 2018-12-21

## 2018-12-20 NOTE — PROGRESS NOTES
SUBJECTIVE:   Juan Castillo Jr. is a 63 year old male who presents to clinic today for the following health issues:    Diabetes Follow-up      Patient is checking blood sugars: not at all    Diabetic concerns: None     Symptoms of hypoglycemia (low blood sugar): none     Paresthesias (numbness or burning in feet) or sores: No     Date of last diabetic eye exam: Within the last year     BP Readings from Last 2 Encounters:   11/09/17 100/66   08/09/16 132/84     Hemoglobin A1C (%)   Date Value   11/09/2017 7.9 (H)   08/09/2016 6.3 (H)     LDL Cholesterol Calculated (mg/dL)   Date Value   11/09/2017 93   08/09/2016 36       Diabetes Management Resources    Amount of exercise or physical activity: Walks     Problems taking medications regularly: No    Medication side effects: none    Diet: Watches carbs and sugars         Hyperlipidemia Follow-Up      Rate your low fat/cholesterol diet?: good    Taking statin?  Yes, no muscle aches from statin    Other lipid medications/supplements?:  none    Hypertension Follow-up      Outpatient blood pressures are not being checked.    Low Salt Diet: no added salt      Problem list and histories reviewed & adjusted, as indicated.  Additional history: as documented    Labs reviewed in EPIC    Reviewed and updated as needed this visit by clinical staff       Reviewed and updated as needed this visit by Provider        Encounter Diagnoses   Name Primary?     Type 2 diabetes mellitus with stage 2 chronic kidney disease, without long-term current use of insulin (H) Yes     Essential hypertension with goal blood pressure less than 140/90      Screening for diabetic peripheral neuropathy      Encounter for screening for HIV low risk      Idiopathic gout, unspecified chronicity, unspecified site, no joint pain      High cholesterol      Need for immunization against influenza          ROS:  Constitutional, HEENT, cardiovascular, pulmonary, gi and gu systems are negative, except as  otherwise noted.    OBJECTIVE:     /87   Pulse 116   Temp 98.7  F (37.1  C) (Oral)   Wt 111 kg (244 lb 11.2 oz)   SpO2 95%   BMI 34.61 kg/m    Body mass index is 34.61 kg/m .  GENERAL: alert and fatigued  EYES: Eyes grossly normal to inspection, PERRL and conjunctivae and sclerae normal  NECK: no adenopathy, no asymmetry, masses, or scars and thyroid normal to palpation  RESP: lungs clear to auscultation - no rales, rhonchi or wheezes  CV: regular rate and rhythm, normal S1 S2, no S3 or S4, no murmur, click or rub, no peripheral edema and peripheral pulses strong  ABDOMEN: soft, nontender, no hepatosplenomegaly, no masses and bowel sounds normal  SKIN: no suspicious lesions or rashes  PSYCH: mentation appears normal, affect normal/bright  Diabetic foot exam: normal DP and PT pulses, no trophic changes or ulcerative lesions and normal sensory exam    Diagnostic Test Results:  Results for orders placed or performed in visit on 12/20/18   HEMOGLOBIN A1C   Result Value Ref Range    Hemoglobin A1C 12.6 (H) 0 - 5.6 %       ASSESSMENT/PLAN:             1. Type 2 diabetes mellitus with stage 2 chronic kidney disease, without long-term current use of insulin (H)  uncontroled  renny have MTM reach out to him and change medications   work on diet/ exercise  Follow up in 3 months.   - glyBURIDE-metFORMIN (GLUCOVANCE) 2.5-500 MG tablet; Take 2 tablets by mouth twice daily with meals  Dispense: 360 tablet; Refill: 3    2. Essential hypertension with goal blood pressure less than 140/90    - HEMOGLOBIN A1C  - Lipid panel reflex to direct LDL Fasting  - Albumin Random Urine Quantitative with Creat Ratio  - Comprehensive metabolic panel  - carvedilol (COREG) 25 MG tablet; TAKE ONE TABLET BY MOUTH TWICE DAILY with meals  Dispense: 180 tablet; Refill: 3  - cloNIDine (CATAPRES) 0.2 MG tablet; TAKE ONE TABLET (0.2 MG) BY MOUTH TWICE DAILY  Dispense: 180 tablet; Refill: 3  - losartan-hydrochlorothiazide (HYZAAR) 100-12.5 MG  tablet; Take 1 tablet by mouth daily  Dispense: 90 tablet; Refill: 3  - metoprolol tartrate (LOPRESSOR) 100 MG tablet; TAKE ONE TABLET (100 mg) BY MOUTH TWICE DAILY  Dispense: 180 tablet; Refill: 3  - terazosin (HYTRIN) 2 MG capsule; Take 1 capsule by mouth in the morning and 2 capsules at bedtime  Dispense: 270 capsule; Refill: 2    3. Screening for diabetic peripheral neuropathy  done  - FOOT EXAM  NO CHARGE [66249.114]    4. Encounter for screening for HIV  Low risk  - HIV Screening    5. Idiopathic gout, unspecified chronicity, unspecified site  Well controlled   - Uric acid    6. High cholesterol  recheck  - simvastatin (ZOCOR) 40 MG tablet; TAKE 1 TABLET (40 mg) BY MOUTH EVERY NIGHT AT BEDTIME.  Dispense: 90 tablet; Refill: 3    7. Need for immunization against influenza  done  - VACCINE ADMINISTRATION, INITIAL    Work on weight loss  Regular exercise  Follow up in 3 months.     Vijay Villanueva MD  [facility]

## 2018-12-21 LAB
ALBUMIN SERPL-MCNC: 3.4 G/DL (ref 3.4–5)
ALP SERPL-CCNC: 88 U/L (ref 40–150)
ALT SERPL W P-5'-P-CCNC: 16 U/L (ref 0–70)
ANION GAP SERPL CALCULATED.3IONS-SCNC: 12 MMOL/L (ref 3–14)
AST SERPL W P-5'-P-CCNC: 12 U/L (ref 0–45)
BILIRUB SERPL-MCNC: 0.8 MG/DL (ref 0.2–1.3)
BUN SERPL-MCNC: 39 MG/DL (ref 7–30)
CALCIUM SERPL-MCNC: 10.1 MG/DL (ref 8.5–10.1)
CHLORIDE SERPL-SCNC: 95 MMOL/L (ref 94–109)
CHOLEST SERPL-MCNC: 168 MG/DL
CO2 SERPL-SCNC: 24 MMOL/L (ref 20–32)
CREAT SERPL-MCNC: 1.69 MG/DL (ref 0.66–1.25)
GFR SERPL CREATININE-BSD FRML MDRD: 42 ML/MIN/{1.73_M2}
GLUCOSE SERPL-MCNC: 382 MG/DL (ref 70–99)
HDLC SERPL-MCNC: 43 MG/DL
HIV 1+2 AB+HIV1 P24 AG SERPL QL IA: NONREACTIVE
LDLC SERPL CALC-MCNC: 82 MG/DL
NONHDLC SERPL-MCNC: 125 MG/DL
POTASSIUM SERPL-SCNC: 4.3 MMOL/L (ref 3.4–5.3)
PROT SERPL-MCNC: 7.6 G/DL (ref 6.8–8.8)
SODIUM SERPL-SCNC: 131 MMOL/L (ref 133–144)
TRIGL SERPL-MCNC: 214 MG/DL
URATE SERPL-MCNC: 8.6 MG/DL (ref 3.5–7.2)

## 2018-12-26 ENCOUNTER — TELEPHONE (OUTPATIENT)
Dept: FAMILY MEDICINE | Facility: CLINIC | Age: 63
End: 2018-12-26

## 2018-12-26 NOTE — TELEPHONE ENCOUNTER
MTM referral from: [facility] visit (referral by provider)    MTM referral outreach attempt #2 on December 26, 2018 at 5:10 PM      Outcome: Patient not reachable after several attempts, will route to MTM Pharmacist/Provider as an FYI. Thank you for the referral.    Nurys Elmore, MTM Coordinator    MTM Pharmacist at clinic where patient receives primary care-yes  Referred by a specialist-no, MTM at speciality clinic-NA  Patient covered for MTM-Yes    Blocked MTM provider schedule-no

## 2019-01-04 ENCOUNTER — TELEPHONE (OUTPATIENT)
Dept: FAMILY MEDICINE | Facility: CLINIC | Age: 64
End: 2019-01-04

## 2019-01-04 NOTE — TELEPHONE ENCOUNTER
Called patient. Relayed provider result message as per below. Patient verbalized understanding. Relayed referral information for MTM, patient verbalized understanding    Cari Lester, RN  Triage Nurse

## 2019-01-04 NOTE — TELEPHONE ENCOUNTER
Left VM for pt to return call to clinic    Notes recorded by Vijay Villanueva MD on 2018 at 12:19 PM CST  Please audelia patient  Labs , as excepted , show  kidney function due to poorly controlled DIABETES MELLITUS  See MTM to get on better DIABETES MELLITUS control  Follow up in 3 months.     Liza Han RN   [facility]

## 2019-01-16 DIAGNOSIS — M10.00 ACUTE IDIOPATHIC GOUT, UNSPECIFIED SITE: ICD-10-CM

## 2019-01-16 RX ORDER — ALLOPURINOL 100 MG/1
TABLET ORAL
Qty: 20 TABLET | Refills: 0 | Status: SHIPPED | OUTPATIENT
Start: 2019-01-16 | End: 2019-01-17

## 2019-01-16 NOTE — TELEPHONE ENCOUNTER
Dr. Villanueva    Please review/sign or advise for refill request of: allopurinol (ZYLOPRIM) 100 MG tablet     Routing refill request to provider for review/approval because:  Labs out of range:  Uric Acid- 8.6; Creat- 1.67 (12/20/2018)  Labs not current:  CBC - 09/22/2014    LOV: 12/20/2018     Thank You!  Cari Lester, RN  Triage Nurse

## 2019-01-16 NOTE — TELEPHONE ENCOUNTER
"Requested Prescriptions   Pending Prescriptions Disp Refills     allopurinol (ZYLOPRIM) 100 MG tablet [Pharmacy Med Name: Allopurinol Oral Tablet 100 MG] 20 tablet 0    Last Written Prescription Date:  11/21/2018  Last Fill Quantity: 20,  # refills: 0   Last office visit: 12/20/2018 with prescribing provider:  12/20/2018   Future Office Visit:   Next 5 appointments (look out 90 days)    Jan 17, 2019  8:00 AM CST  Office Visit with Lea Arroyo JIMMY  [facility] [place] [place] [address] [place] [address] [address]  [facility] [id number] [phone]   Mar 21, 2019 10:00 AM CDT  Office Visit with Vijay Villanueva MD  [facility] ([facility]) [address] [place] [address] 700  [facility] [id number] [phone]          Sig: take one and one-half tablets (150mg) by mouth once daily -- see md for more pills    Gout Agents Protocol Failed - 1/16/2019 12:23 PM       Failed - CBC on file in past 12 months    Recent Labs   Lab Test 09/22/14  1002   WBC 6.5   RBC 3.71*   HGB 11.7*   HCT 36.1*                   Failed - Has Uric Acid on file in past 12 months and value is less than 6    Recent Labs   Lab Test 12/20/18  1234   URIC 8.6*     If level is 6mg/dL or greater, ok to refill one time and refer to provider.          Failed - Normal serum creatinine on file in the past 12 months    Recent Labs   Lab Test 12/20/18  1324   CR 1.69*            Passed - ALT on file in past 12 months    Recent Labs   Lab Test 12/20/18  1324   ALT 16            Passed - Recent (12 mo) or future (30 days) visit within the authorizing provider's specialty    Patient had office visit in the last 12 months or has a visit in the next 30 days with authorizing provider or within the authorizing provider's specialty.  See \"Patient Info\" tab in inbasket, or \"Choose Columns\" in Meds & Orders section of the refill encounter.          "    Passed - Medication is active on med list       Passed - Patient is age 18 or older

## 2019-01-17 ENCOUNTER — OFFICE VISIT (OUTPATIENT)
Dept: PHARMACY | Facility: CLINIC | Age: 64
End: 2019-01-17
Payer: COMMERCIAL

## 2019-01-17 VITALS — DIASTOLIC BLOOD PRESSURE: 58 MMHG | SYSTOLIC BLOOD PRESSURE: 90 MMHG

## 2019-01-17 DIAGNOSIS — E11.22 TYPE 2 DIABETES MELLITUS WITH CHRONIC KIDNEY DISEASE, WITHOUT LONG-TERM CURRENT USE OF INSULIN, UNSPECIFIED CKD STAGE (H): ICD-10-CM

## 2019-01-17 DIAGNOSIS — I10 HYPERTENSION GOAL BP (BLOOD PRESSURE) < 140/90: ICD-10-CM

## 2019-01-17 DIAGNOSIS — Z12.11 SPECIAL SCREENING FOR MALIGNANT NEOPLASMS, COLON: ICD-10-CM

## 2019-01-17 DIAGNOSIS — E11.9 TYPE 2 DIABETES, HBA1C GOAL < 8% (H): Primary | ICD-10-CM

## 2019-01-17 DIAGNOSIS — M10.00 ACUTE IDIOPATHIC GOUT, UNSPECIFIED SITE: ICD-10-CM

## 2019-01-17 DIAGNOSIS — E78.5 HYPERLIPIDEMIA LDL GOAL <100: ICD-10-CM

## 2019-01-17 PROCEDURE — 99605 MTMS BY PHARM NP 15 MIN: CPT | Performed by: PHARMACIST

## 2019-01-17 PROCEDURE — 99607 MTMS BY PHARM ADDL 15 MIN: CPT | Performed by: PHARMACIST

## 2019-01-17 RX ORDER — GLIPIZIDE AND METFORMIN HCL 5; 500 MG/1; MG/1
2 TABLET, FILM COATED ORAL
Qty: 360 TABLET | Refills: 3 | Status: SHIPPED | OUTPATIENT
Start: 2019-01-17 | End: 2020-01-17

## 2019-01-17 RX ORDER — MV-MIN/FOLIC/VIT K/LYCOP/COQ10 200-100MCG
1 CAPSULE ORAL DAILY
COMMUNITY
Start: 2019-01-17

## 2019-01-17 RX ORDER — LANCETS
EACH MISCELLANEOUS
Qty: 100 EACH | Refills: 11 | Status: SHIPPED | OUTPATIENT
Start: 2019-01-17

## 2019-01-17 RX ORDER — ALLOPURINOL 100 MG/1
100 TABLET ORAL DAILY
Qty: 20 TABLET | Refills: 0 | COMMUNITY
Start: 2019-01-17 | End: 2019-04-29

## 2019-01-17 RX ORDER — INSULIN GLARGINE 100 [IU]/ML
20 INJECTION, SOLUTION SUBCUTANEOUS DAILY
Qty: 15 ML | Refills: 3 | Status: SHIPPED | OUTPATIENT
Start: 2019-01-17 | End: 2019-04-09

## 2019-01-17 RX ORDER — BLOOD-GLUCOSE METER
EACH MISCELLANEOUS
Qty: 1 KIT | Refills: 0 | Status: SHIPPED | OUTPATIENT
Start: 2019-01-17

## 2019-01-17 NOTE — PATIENT INSTRUCTIONS
Recommendations from today's MTM visit:                                                    MTM (medication therapy management) is a service provided by a clinical pharmacist designed to help you get the most of out of your medicines.   Today we reviewed what your medicines are for, how to know if they are working, that your medicines are safe and how to make your medicine regimen as easy as possible.     1. Start Basaglar 20 units once a day. See below for reminder on instructions on how to use it.    2. Test blood sugar at least once a day in the morning before breakfast.  Goal is .      3. Stop glyburide/metformin and start glipizide/metformin.  Same dose, 2 pills twice a day.    4. Stop terazosin.    Next MTM visit: by phone in 1 week    To schedule another MTM appointment, please call the clinic directly or you may call the MTM scheduling line at [phone] or toll-free at [phone].     My Clinical Pharmacist's contact information:                                                      It was a pleasure talking with you today!  Please feel free to contact me with any questions or concerns you have.      Lea Arroyo, PharmD, [facility]   [phone]    You may receive a survey about the MTM services you received.  I would appreciate your feedback to help me serve you better in the future. Please fill it out and return it when you can. Your comments will be anonymous.          Patient Education   Long-acting Insulin Glargine (GLAR-jeen), Detemir (DE-te-issac), Degludec (de-GLOO-dek)  Brand names: Lantus, Basaglar, Toujeo, Levemir and Tresiba  What does it do?  Provides the insulin the body needs to maintain normal levels of blood glucose.  This is a basal (long-acting) insulin. It does not cover meals and snacks. It starts working in one to two hours. It may be given one or two times each day.  How do I take it?    This insulin is given as a shot under the skin of your upper arms, upper legs or stomach  (abdomen). Use it every day at the same time.    Rotate where you take the shot within your chosen area. For example, if you always inject in your stomach, inject a different spot in your stomach each time. Your doctor will tell you the dose and how often to inject it.    It comes in vials or pens. Do not shake them. Do not use if insulin is a different color than normal.    If you miss a dose: Take it as soon as you can. If it is more than six hours past your dose time, call your diabetes care team. Never take two doses at the same time.    Never mix this with any other type of insulin in the same syringe.    Never use a syringe to take insulin out of an insulin pen.  What are the possible side effects?  You may have redness, swelling, pain, itching, burning or a lump at the injection site (where the shot was given).  You may have changes at the injection spot. The fatty tissue under the skin may shrink or thicken. Rotating the spot where you inject can help prevent this. Do not inject into skin that is like this.   You may also have low blood glucose (less than 70). Signs of low blood glucose include:    Sweating    Feeling shaky    Sudden hunger    Fast heartbeat    Feeling dizzy, confused or weak    Headache or problems seeing    Feeling irritable  How should I store my insulin?    Store opened insulin at room temperature. Keep it away from sunlight and heat.    Store unopened insulin in the refrigerator. Do not freeze it.    Keep medicine out of the reach of children and pets.  Type of insulin How long can I keep it at room temperature?   Lantus (insulin glargine) 28 days   Basaglar (insulin glargine) 28 days   Toujeo (insulin glargine) 42 days   Levemir (insulin detemir) 42 days   Tresiba (insulin degludec) 56 days   When should I call my diabetes care team?  You should call the doctor right away if you notice any of the following:    Frequent high or low blood glucose    Nausea (feeling sick to your stomach)  or vomiting (throwing up)    Flu-like symptoms (fever, chills)    Any sign of an allergic reaction, such as:  ? Hives or itchy skin  ? Swelling in the face, hands, mouth or throat  ? Tingling in the mouth or throat  ? A tight feeling in the chest  ? Trouble breathing, wheezing    Stinging at the site where you inject the insulin  What else should I know before taking this medicine?    Before taking this medicine, tell your doctor:  ? About all medicines you are taking, including over-the-counter drugs and herbal products.  ? If you have kidney or liver disease.  ? If you are pregnant or planning to get pregnant.  ? If you are breastfeeding or plan to breastfeed.    Always carry some form of carbohydrate with you.    Your dose may need to change when you are ill or when you change your diet or activity.    Alcohol may cause symptoms that feel like low blood glucose. Check your blood glucose often when drinking alcohol.    Do not change brands of insulin without talking to your doctor.    For informational purposes only. Not to replace the advice of your health care provider.Copyright   2008 [facility]. All rights reserved. fsboWOW 498377 - 04/17.

## 2019-01-17 NOTE — PROGRESS NOTES
"SUBJECTIVE/OBJECTIVE:                           Juan Castillo Jr. is a 63 year old male coming in for an initial visit for Medication Therapy Management.  He was referred to me from Vijay Villanueva.    Chief Complaint: high A1c    Allergies/ADRs: Reviewed in Epic  Tobacco: No tobacco use  Alcohol: 1-3 beverages / week per patient report, however per chart review, history of alcohol abuse with admission 6/2018 and has been to rehab.   Caffeine: 1/4 cups/day of coffee  Activity: states he walks for exercise, however notable difficulty walking when rooming patient due to hip pain.  Also had difficulty getting out of the clinic, needed to lean on the wall for support and take several breaks, declined assistance to his car.   PMH: Reviewed in Epic  Pt is retired, had worked in management/consulting.     Medication Adherence/Access:  Patient uses pill box(es).  Patient takes medications 2 time(s) per day.   Per patient, misses medication 0 times per week.     Diabetes:  Pt currently taking glyburide/metformin 5/100mg BID. Pt is not experiencing side effects.  SMBG: rarely.   None recently.  Patient is not experiencing hypoglycemia  Recent symptoms of high blood sugar? Denies all symptoms. Unable to identify any reason for significant worsening in blood sugar control in the past year.   Eye exam: due  Foot exam: up to date  ACEi/ARB: Yes: losartan.   Urine Albumin:   Lab Results   Component Value Date    UMALCR 52.87 (H) 08/09/2016      Aspirin: Taking 81mg daily and denies side effects  Diet/Exercise: \"diet is pretty good\", gave up ice cream when diagnosed which was difficult. Generally not eating sweets. Eats 3 meals per day.   Lab Results   Component Value Date    A1C 12.6 12/20/2018    A1C 7.9 11/09/2017    A1C 6.3 08/09/2016    A1C 9.1 09/22/2015    A1C 7.6 02/03/2015     Hypertension: Current medications include carvedilol 25mg BID, clonidine 0.2mg BID, losartan/hctz 100/12.5mg daily, terazosin 2mg at " "bedtime.  Patient does self-monitor BP. Home BP monitoring in range of 130's systolic over 80's diastolic.  Patient reports no current medication side effects. Denies any dizziness, lightheadness.  BP Readings from Last 3 Encounters:   01/17/19 90/58   12/20/18 136/87   11/09/17 100/66     Hyperlipidemia: Current therapy includes simvastatin 40mg once daily.  Pt reports no significant myalgias or other side effects.  Recent Labs   Lab Test 12/20/18  1324 11/09/17  1038  09/22/15  1358 07/07/14  1100   CHOL 168 167   < > 113 110   HDL 43 28*   < > 26* 24*   LDL 82 93   < > 31 50   TRIG 214* 229*   < > 280* 177*   CHOLHDLRATIO  --   --   --  4.3 4.5    < > = values in this interval not displayed.     The 10-year ASCVD risk score (Paris ROYAL Jr., et al., 2013) is: 22.7%    Values used to calculate the score:      Age: 63 years      Sex: Male      Is Non- : No      Diabetic: Yes      Tobacco smoker: No      Systolic Blood Pressure: 136 mmHg      Is BP treated: Yes      HDL Cholesterol: 43 mg/dL      Total Cholesterol: 168 mg/dL    Gout: Currently taking allopurinol 100mg daily (not 150mg as prescribed, doesn't want to cut pills). Pt reports no current pain concerns. No flares in years. Pt is experiencing the following medication side effects: none.   Uric Acid   Date Value Ref Range Status   12/20/2018 8.6 (H) 3.5 - 7.2 mg/dL Final   ]    Today's Vitals: BP 90/58     Est CrCl (C[place]roft-Gault, AjBW 88.9kg) ~ 57 ml/min  Wt Readings from Last 1 Encounters:   12/20/18 244 lb 11.2 oz (111 kg)     Ht Readings from Last 1 Encounters:   08/09/16 5' 10.5\" (1.791 m)     Creatinine   Date Value Ref Range Status   12/20/2018 1.69 (H) 0.66 - 1.25 mg/dL Final   ]    ASSESSMENT:                             Current medications were reviewed today.     Medication Adherence: excellent, no issues identified    Diabetes: needs improvement. A1c not at goal <7%. Prefer not to use glyburide due to CKD, will change to " glipizide. Considered additional therapies, pt agreeable to insulin, preferred considering amount of A1c lowering and pt reported history of elevated fasting BS levels. Reviewed formulary, appears Basaglar is covered. Dosed at ~0.2u/kg/day. Education provided on injection technique, use of insulin, storage, etc.  Recommend restarting testing at least once daily fasting, pt reluctantly agrees. Ordered new supplies.     Hypertension: BP quite low today x2. Asymptomatic but upon review of regimen, will discontinue low dose terazosin.     Hyperlipidemia: needs improvement. Pt is taking mod-intensity statin and considering his 10-yr CV risk, recommended to switch to high-intensity. Deferred this change to follow-up but pt aware.    Gout: improved. Uric acid level not at goal <6.  Recommend he increase allopurinol to 200mg but pt declines, states medication has been working well and doesn't see a need for changing based on lab levels.     Of note, pt was very guarded throughout visit and appears depressed. Will notify PCP.     PLAN:                            1. Stop glyburide/metformin.  Start glipizide/metformin 10/1000mg BID  2. Start Basaglar 20 unit(s) daily  3. Stop terazosin  4. Start checking blood sugar at least once daily in the morning    I spent 60 minutes with this patient today. All changes were made via collaborative practice agreement with Vijay Villanueva. A copy of the visit note was provided to the patient's primary care provider.    Will follow up in 1 week.    The patient was given a summary of these recommendations as an after visit summary.     Lea Arroyo, PharmD, BCACP

## 2019-01-17 NOTE — Clinical Note
FYI - not sure if you have a good enough relationship with him based on how often he comes in, but he sure seems depressed to me - have you tried to talk to him about mood and his drinking? Seems like a chronic underreporter and telling me what I wanted to hear.

## 2019-01-24 ENCOUNTER — TELEPHONE (OUTPATIENT)
Dept: PHARMACY | Facility: CLINIC | Age: 64
End: 2019-01-24

## 2019-01-24 NOTE — TELEPHONE ENCOUNTER
Attempted to call pt x2 for insulin start follow-up.  No answer and LVM    Called pharmacy, pt has not picked up insulin.  Copay is $15.    Lea Arroyo, PeterD, BCACP

## 2019-01-31 NOTE — TELEPHONE ENCOUNTER
Attempted to call pt to follow-up on diabetes visit and med changes, no answer and LVM.    Lea Arroyo, PharmD, BCACP

## 2019-02-08 ENCOUNTER — TELEPHONE (OUTPATIENT)
Dept: FAMILY MEDICINE | Facility: CLINIC | Age: 64
End: 2019-02-08

## 2019-02-08 NOTE — LETTER
[address]  [facility] [id number] [phone]          February 8, 2019    RE: Juan Castillo Jr.                                                                                                                     [address] [facility] [address]            To whom it may concern,    Juan Castillo is under my medical care, he has recently started a new treatment plan and it is not advised he travel out of the country at this time.      Sincerely,             Vijay Villanueva MD

## 2019-02-08 NOTE — TELEPHONE ENCOUNTER
Pt's wife is requesting a letter stating that the pt is not recommended to travel due to a medical change in the last 3 weeks.    Pt's wife did not know where that letter needs to be sent yet, will call with that information    Pt's wife can be [phone]

## 2019-02-08 NOTE — TELEPHONE ENCOUNTER
Dr. Villanueva      They were going to take a trip to [place] in about a week and 1/2  They are not comfortable doing so as he has recently had a change in his treatment plan    Letter started for your review/revision and signature      tripmate insurance, wife will call with info where to send the letter    Liza Han RN   [facility]

## 2019-02-08 NOTE — TELEPHONE ENCOUNTER
Signed letter on i[employer] desk until pt/wife calls back with where they wish to send it    Liza Han RN   [facility]

## 2019-02-13 ENCOUNTER — TELEPHONE (OUTPATIENT)
Dept: FAMILY MEDICINE | Facility: CLINIC | Age: 64
End: 2019-02-13

## 2019-02-13 NOTE — TELEPHONE ENCOUNTER
Reason for call:  Form   Our goal is to have forms completed within 72 hours, however some forms may require a visit or additional information.     Who is the form from? Patient  Where did the form come from? form was faxed in  What clinic location was the form placed at? rd  Where was the form placed? RNs  What number is listed as a contact on the form? home    Phone call message - patient request for a letter, form or note:     Date needed: as soon as possible  Please call patient and Please fax to [phone]  Has the patient signed a consent form for release of information? YES    Additional comments: Please see note below about specific instructions regarding form completion.    Type of letter, form or note: Flight cancelation form    Phone number to reach patient:  Home number on file [phone] (home)    Best Time:  n/a    Can we leave a detailed message on this number?  Unknown

## 2019-02-13 NOTE — TELEPHONE ENCOUNTER
Reason for call:  Other   Patient called regarding (reason for call): call back    Additional comments: The patients wife called and stated that she needed Dr. Villanueva to fill out a form. She stated that are specific instruction that need to be followed in order to fill it out correctly. She would like a call back to discuss this so he can fill it out.    Phone number to reach patient:  Other phone number: [phone]    Best Time: Any    Can we leave a detailed message on this number?  YES

## 2019-02-13 NOTE — TELEPHONE ENCOUNTER
Dr. Villanueva and Lea ([facility])    Called patient's spouse, Soraida, she states that she will be faxing over a form form their travel insurance company that they need completed. They cancelled their trip to [place] due to the patient's change in health and starting insulin medication. She states that the patient wasn't happy about not going to [place], but she does not feel comfortable traveling outside of the [place] with this new change in his care. She does not want to be in [place] until pt is stable on his new medication.    They will need the form filled out. She said that she understand that the pt isn't necessarily disabled from traveling, but she is worried that if we check the box that states that the patient is able to travel that she will have to eat the cost of air fare. If that is the case, she said that she understands, but she is hoping that we could state that the patient is unable to travel, or at least unable to travel outside the [place].    She said that she patient just started his insulin on [place] 02/10/19, however he had his insulin for about 10 days before starting it, and she doesn't believe that he has taken it everyday since Sunday. She states that he is also not testing his blood sugars at all, and he is not watching what the eats.     She states that the patient wasn't happy about not going to [place], but she does not feel comfortable traveling outside of the [place] with this new change in his care. She does not want to be in [place] until pt is stable on his new medication.    Pt is already scheduled for follow-up appt with Kay on 03/21/19. Called patient. LVM to call clinic. He needs to f/u with Lea. He was no show to telephone f/u appt on 01/24/19.    Sherrie Tijerina RN  [facility]

## 2019-02-14 NOTE — TELEPHONE ENCOUNTER
Form faxed to Soraida Castillo [phone] and directly to Jason Hernandez at patient request [phone]

## 2019-02-21 ENCOUNTER — TELEPHONE (OUTPATIENT)
Dept: FAMILY MEDICINE | Facility: CLINIC | Age: 64
End: 2019-02-21

## 2019-02-21 NOTE — TELEPHONE ENCOUNTER
Returned call to patient's wife, patient states she is unsure if the patient is using his insulin as prescribed. Patient's wife states that when she checks his pens in the refrigerator, all the pens ave a 0 on the dial.     Writer informed patient's wife that the pens will always read 0 if they are not being used. The dial at the top is used to dial up the amount of insulin the patients needs to inject. Writer informed wife that after the insulin is injected, the dial will return back to 0. Patient's wife verbalized understanding.     Patient's wife does not have any further questions at this time. Closing encounter, no further action required    Cari Lester RN  Triage Nurse

## 2019-02-21 NOTE — TELEPHONE ENCOUNTER
Reason for call:  Other   Patient called regarding (reason for call): call back  Additional comments: The patients wife called and stated that she has questions about how to tell if her  has been taking his insulin. She stated he keeps telling her he has been taking it but whenever she looks at the pen it seems like it is still at zero. She would like a call back to discuss how to read the doses that have been taken on his insulin pen.     Phone number to reach patient:  Other phone number: [phone]    Best Time: Any    Can we leave a detailed message on this number?  YES

## 2019-03-23 DIAGNOSIS — M10.00 ACUTE IDIOPATHIC GOUT, UNSPECIFIED SITE: ICD-10-CM

## 2019-03-25 RX ORDER — ALLOPURINOL 100 MG/1
TABLET ORAL
Qty: 20 TABLET | Refills: 0 | Status: SHIPPED | OUTPATIENT
Start: 2019-03-25

## 2019-03-25 NOTE — TELEPHONE ENCOUNTER
Called patient. Per LOV, 12/20/18, pt to f/u in 3 months. Pt scheduled f/u appt with Dr. Villanueva for 04/09/19.    Medication is being filled for 1 time refill only due to:  Patient needs to be seen because due for DM f/u.    Sherrie Tijerina RN  [facility]

## 2019-03-25 NOTE — TELEPHONE ENCOUNTER
"Requested Prescriptions   Pending Prescriptions Disp Refills     allopurinol (ZYLOPRIM) 100 MG tablet [Pharmacy Med Name: Allopurinol Oral Tablet 100 MG] 20 tablet 0    Last Written Prescription Date:  01/17/2019  Last Fill Quantity: 20,  # refills: 0   Last office visit: 12/20/2018 with prescribing provider:  12/20/2018   Future Office Visit:   Sig: TAKE 1 AND 1/2 TABLETS (150 mg) BY MOUTH once DAILY. see md for more pills    Gout Agents Protocol Failed - 3/23/2019 11:31 AM       Failed - CBC on file in past 12 months    Recent Labs   Lab Test 09/22/14  1002   WBC 6.5   RBC 3.71*   HGB 11.7*   HCT 36.1*                   Failed - Has Uric Acid on file in past 12 months and value is less than 6    Recent Labs   Lab Test 12/20/18  1234   URIC 8.6*     If level is 6mg/dL or greater, ok to refill one time and refer to provider.          Failed - Normal serum creatinine on file in the past 12 months    Recent Labs   Lab Test 12/20/18  1324   CR 1.69*            Passed - ALT on file in past 12 months    Recent Labs   Lab Test 12/20/18  1324   ALT 16            Passed - Recent (12 mo) or future (30 days) visit within the authorizing provider's specialty    Patient had office visit in the last 12 months or has a visit in the next 30 days with authorizing provider or within the authorizing provider's specialty.  See \"Patient Info\" tab in inbasket, or \"Choose Columns\" in Meds & Orders section of the refill encounter.             Passed - Medication is active on med list       Passed - Patient is age 18 or older        "

## 2019-04-09 ENCOUNTER — OFFICE VISIT (OUTPATIENT)
Dept: FAMILY MEDICINE | Facility: CLINIC | Age: 64
End: 2019-04-09
Payer: COMMERCIAL

## 2019-04-09 VITALS
DIASTOLIC BLOOD PRESSURE: 81 MMHG | BODY MASS INDEX: 34.05 KG/M2 | SYSTOLIC BLOOD PRESSURE: 132 MMHG | OXYGEN SATURATION: 97 % | WEIGHT: 240.7 LBS | TEMPERATURE: 98.1 F | HEART RATE: 87 BPM

## 2019-04-09 DIAGNOSIS — E11.22 TYPE 2 DIABETES MELLITUS WITH STAGE 2 CHRONIC KIDNEY DISEASE, WITH LONG-TERM CURRENT USE OF INSULIN (H): Primary | ICD-10-CM

## 2019-04-09 DIAGNOSIS — N18.2 TYPE 2 DIABETES MELLITUS WITH STAGE 2 CHRONIC KIDNEY DISEASE, WITH LONG-TERM CURRENT USE OF INSULIN (H): Primary | ICD-10-CM

## 2019-04-09 DIAGNOSIS — M10.9 ACUTE GOUTY ARTHRITIS: ICD-10-CM

## 2019-04-09 DIAGNOSIS — Z79.4 TYPE 2 DIABETES MELLITUS WITH STAGE 2 CHRONIC KIDNEY DISEASE, WITH LONG-TERM CURRENT USE OF INSULIN (H): Primary | ICD-10-CM

## 2019-04-09 DIAGNOSIS — I10 ESSENTIAL HYPERTENSION WITH GOAL BLOOD PRESSURE LESS THAN 140/90: ICD-10-CM

## 2019-04-09 DIAGNOSIS — M16.12 PRIMARY OSTEOARTHRITIS OF LEFT HIP: ICD-10-CM

## 2019-04-09 LAB
ANION GAP SERPL CALCULATED.3IONS-SCNC: 10 MMOL/L (ref 3–14)
BUN SERPL-MCNC: 46 MG/DL (ref 7–30)
CALCIUM SERPL-MCNC: 9.2 MG/DL (ref 8.5–10.1)
CHLORIDE SERPL-SCNC: 101 MMOL/L (ref 94–109)
CO2 SERPL-SCNC: 24 MMOL/L (ref 20–32)
CREAT SERPL-MCNC: 1.96 MG/DL (ref 0.66–1.25)
GFR SERPL CREATININE-BSD FRML MDRD: 35 ML/MIN/{1.73_M2}
GLUCOSE SERPL-MCNC: 329 MG/DL (ref 70–99)
HBA1C MFR BLD: 11.1 % (ref 0–5.6)
POTASSIUM SERPL-SCNC: 4.3 MMOL/L (ref 3.4–5.3)
SODIUM SERPL-SCNC: 135 MMOL/L (ref 133–144)
URATE SERPL-MCNC: 7 MG/DL (ref 3.5–7.2)

## 2019-04-09 PROCEDURE — 36415 COLL VENOUS BLD VENIPUNCTURE: CPT | Performed by: FAMILY MEDICINE

## 2019-04-09 PROCEDURE — 84550 ASSAY OF BLOOD/URIC ACID: CPT | Performed by: FAMILY MEDICINE

## 2019-04-09 PROCEDURE — 83036 HEMOGLOBIN GLYCOSYLATED A1C: CPT | Performed by: FAMILY MEDICINE

## 2019-04-09 PROCEDURE — 80048 BASIC METABOLIC PNL TOTAL CA: CPT | Performed by: FAMILY MEDICINE

## 2019-04-09 PROCEDURE — 99214 OFFICE O/P EST MOD 30 MIN: CPT | Performed by: FAMILY MEDICINE

## 2019-04-09 RX ORDER — INSULIN GLARGINE 100 [IU]/ML
30 INJECTION, SOLUTION SUBCUTANEOUS DAILY
Qty: 15 ML | Refills: 3 | Status: SHIPPED | OUTPATIENT
Start: 2019-04-09

## 2019-04-09 NOTE — PROGRESS NOTES
SUBJECTIVE:   Juan Castillo Jr. is a 64 year old male who presents to clinic today for the following   health issues:    Diabetes Follow-up      Patient is checking blood sugars: not at all    Diabetic concerns: None     Symptoms of hypoglycemia (low blood sugar): none     Paresthesias (numbness or burning in feet) or sores: No     Date of last diabetic eye exam:in the last year     BP Readings from Last 2 Encounters:   01/17/19 90/58   12/20/18 136/87     Hemoglobin A1C (%)   Date Value   12/20/2018 12.6 (H)   11/09/2017 7.9 (H)     LDL Cholesterol Calculated (mg/dL)   Date Value   12/20/2018 82   11/09/2017 93       Diabetes Management Resources    Amount of exercise or physical activity: None    Problems taking medications regularly: No    Medication side effects: none    Diet: diabetic        Hyperlipidemia Follow-Up      Rate your low fat/cholesterol diet?: good    Taking statin?  Yes, no muscle aches from statin    Other lipid medications/supplements?:  none    Hypertension Follow-up      Outpatient blood pressures are not being checked.    Low Salt Diet: no added salt    Gout much improved on allopurinal  left hip much worse/ painful with any walking ( Had Recheck visit 1month. JOAQUINA 2011)    Additional history: as documented    Reviewed  and updated as needed this visit by clinical staff         Reviewed and updated as needed this visit by Provider         Labs reviewed in EPIC    ROS:  Constitutional, HEENT, cardiovascular, pulmonary, gi and gu systems are negative, except as otherwise noted.    OBJECTIVE:     /81   Pulse 87   Temp 98.1  F (36.7  C) (Oral)   Wt 109.2 kg (240 lb 11.2 oz)   SpO2 97%   BMI 34.05 kg/m    Body mass index is 34.05 kg/m .  GENERAL: alert, mod distress with waking, over weight, elderly and fatigued  NECK: no adenopathy, no asymmetry, masses, or scars and thyroid normal to palpation  RESP: lungs clear to auscultation - no rales, rhonchi or wheezes  CV: regular rate and  rhythm, normal S1 S2, no S3 or S4, no murmur, click or rub, no peripheral edema and peripheral pulses strong  ABDOMEN: soft, nontender, no hepatosplenomegaly, no masses and bowel sounds normal  MS: normal muscle tone and tenderness to palpation left hip  PSYCH: mentation appears normal, affect normal/bright  Diabetic foot exam: normal DP and PT pulses, no trophic changes or ulcerative lesions and normal sensory exam    Diagnostic Test Results:  Results for orders placed or performed in visit on 04/09/19   Hemoglobin A1c   Result Value Ref Range    Hemoglobin A1C 11.1 (H) 0 - 5.6 %       ASSESSMENT/PLAN:             1. Type 2 diabetes mellitus with stage 2 chronic kidney disease, with long-term current use of insulin (H)  uncontroled   increase insulin  Follow up with MTM   - Albumin Random Urine Quantitative with Creat Ratio; Future  - Hemoglobin A1c  - Basic metabolic panel  - insulin glargine (BASAGLAR KWIKPEN) 100 UNIT/ML pen; Inject 30 Units Subcutaneous daily  Dispense: 15 mL; Refill: 3    2. Essential hypertension with goal blood pressure less than 140/90  Well controlled   - Albumin Random Urine Quantitative with Creat Ratio; Future  - Basic metabolic panel    3. Acute gouty arthritis  Well controlled on allopurinol  - Uric acid    4. Primary osteoarthritis of left hip  Worse   Follow up with consultant as planned.   - ORTHOPEDICS ADULT REFERRAL    See Patient Instructions    Vijay Villanueva MD  [facility]

## 2019-04-29 DIAGNOSIS — M10.00 ACUTE IDIOPATHIC GOUT, UNSPECIFIED SITE: ICD-10-CM

## 2019-04-29 RX ORDER — ALLOPURINOL 100 MG/1
100 TABLET ORAL DAILY
Qty: 90 TABLET | Refills: 0 | Status: SHIPPED | OUTPATIENT
Start: 2019-04-29

## 2019-04-29 NOTE — TELEPHONE ENCOUNTER
"Requested Prescriptions   Pending Prescriptions Disp Refills     allopurinol (ZYLOPRIM) 100 MG tablet 20 tablet 0     Sig: Take 1 tablet (100 mg) by mouth daily  Last Written Prescription Date:  03/25/2019  Last Fill Quantity: 20,  # refills: 0   Last office visit: 4/9/2019 with prescribing provider:  04/09/2019   Future Office Visit:         Gout Agents Protocol Failed - 4/29/2019  9:47 AM        Failed - CBC on file in past 12 months     Recent Labs   Lab Test 09/22/14  1002   WBC 6.5   RBC 3.71*   HGB 11.7*   HCT 36.1*                    Failed - Has Uric Acid on file in past 12 months and value is less than 6     Recent Labs   Lab Test 04/09/19  1120   URIC 7.0     If level is 6mg/dL or greater, ok to refill one time and refer to provider.           Failed - Normal serum creatinine on file in the past 12 months     Recent Labs   Lab Test 04/09/19  1120   CR 1.96*             Passed - ALT on file in past 12 months     Recent Labs   Lab Test 12/20/18  1324   ALT 16             Passed - Recent (12 mo) or future (30 days) visit within the authorizing provider's specialty     Patient had office visit in the last 12 months or has a visit in the next 30 days with authorizing provider or within the authorizing provider's specialty.  See \"Patient Info\" tab in inbasket, or \"Choose Columns\" in Meds & Orders section of the refill encounter.              Passed - Medication is active on med list        Passed - Patient is age 18 or older        "

## 2019-04-29 NOTE — TELEPHONE ENCOUNTER
Dr. Villanueva,  Please review/sign or advise for refill request of: allopurinol (ZYLOPRIM) 100 MG tablet    Routing refill request to provider for review/approval because:  Labs out of range:  Uric Acid-7.0; Creat-1.96  Labs not current:  CBC    LOV: 04/09/2019    Would you like lab only to update blood work?    Thank You!  Cari Lester, RN  Triage Nurse

## 2019-05-21 ENCOUNTER — TELEPHONE (OUTPATIENT)
Dept: PHARMACY | Facility: CLINIC | Age: 64
End: 2019-05-21

## 2019-05-21 NOTE — TELEPHONE ENCOUNTER
Attempted to call for follow-up on insulin start and diabetes, no answer and LVM    Lea Arroyo, PharmD, BCACP

## 2019-06-13 ENCOUNTER — TELEPHONE (OUTPATIENT)
Dept: FAMILY MEDICINE | Facility: CLINIC | Age: 64
End: 2019-06-13

## 2019-06-13 NOTE — TELEPHONE ENCOUNTER
Panel Management Review      Patient has the following on his problem list:     Diabetes    ASA: Passed    Last A1C  Lab Results   Component Value Date    A1C 11.1 04/09/2019    A1C 12.6 12/20/2018    A1C 7.9 11/09/2017    A1C 6.3 08/09/2016    A1C 9.1 09/22/2015     A1C tested: FAILED    Last LDL:    Lab Results   Component Value Date    CHOL 168 12/20/2018     Lab Results   Component Value Date    HDL 43 12/20/2018     Lab Results   Component Value Date    LDL 82 12/20/2018     Lab Results   Component Value Date    TRIG 214 12/20/2018     Lab Results   Component Value Date    CHOLHDLRATIO 4.3 09/22/2015     Lab Results   Component Value Date    NHDL 125 12/20/2018       Is the patient on a Statin? YES             Is the patient on Aspirin? YES    Medications     HMG CoA Reductase Inhibitors     simvastatin (ZOCOR) 40 MG tablet       Salicylates     aspirin 81 MG tablet             Last three blood pressure readings:  BP Readings from Last 3 Encounters:   04/09/19 132/81   01/17/19 90/58   12/20/18 136/87       Date of last diabetes office visit: 04/09/2019     Tobacco History:     History   Smoking Status     Never Smoker   Smokeless Tobacco     Never Used         Hypertension   Last three blood pressure readings:  BP Readings from Last 3 Encounters:   04/09/19 132/81   01/17/19 90/58   12/20/18 136/87     Blood pressure: Passed    HTN Guidelines:  Less than 140/90      Composite cancer screening  Chart review shows that this patient is due/due soon for the following Colonoscopy  Summary:    Patient is due/failing the following:   A1C, COLONOSCOPY and PHYSICAL    Action needed:   Patient needs office visit for physical, colonoscopy and follow up with Diabetes in July.    Type of outreach:    Phone, left message for patient to call back.     Questions for provider review:    None                                                                                                                                     Brendan Simpson CMA.       Chart routed to none.

## 2019-06-28 ENCOUNTER — TELEPHONE (OUTPATIENT)
Dept: FAMILY MEDICINE | Facility: CLINIC | Age: 64
End: 2019-06-28

## 2019-06-28 NOTE — TELEPHONE ENCOUNTER
Panel Management Review      Patient has the following on his problem list:     Diabetes    ASA: Passed    Last A1C  Lab Results   Component Value Date    A1C 11.1 04/09/2019    A1C 12.6 12/20/2018    A1C 7.9 11/09/2017    A1C 6.3 08/09/2016    A1C 9.1 09/22/2015     A1C tested: FAILED    Last LDL:    Lab Results   Component Value Date    CHOL 168 12/20/2018     Lab Results   Component Value Date    HDL 43 12/20/2018     Lab Results   Component Value Date    LDL 82 12/20/2018     Lab Results   Component Value Date    TRIG 214 12/20/2018     Lab Results   Component Value Date    CHOLHDLRATIO 4.3 09/22/2015     Lab Results   Component Value Date    NHDL 125 12/20/2018       Is the patient on a Statin? YES             Is the patient on Aspirin? YES    Medications     HMG CoA Reductase Inhibitors     simvastatin (ZOCOR) 40 MG tablet       Salicylates     aspirin 81 MG tablet             Last three blood pressure readings:  BP Readings from Last 3 Encounters:   04/09/19 132/81   01/17/19 90/58   12/20/18 136/87       Date of last diabetes office visit: 04/09/2019     Tobacco History:     History   Smoking Status     Never Smoker   Smokeless Tobacco     Never Used         Hypertension   Last three blood pressure readings:  BP Readings from Last 3 Encounters:   04/09/19 132/81   01/17/19 90/58   12/20/18 136/87     Blood pressure: Passed    HTN Guidelines:  Less than 140/90      Composite cancer screening  Chart review shows that this patient is due/due soon for the following Colonoscopy  Summary:    Patient is due/failing the following:   EYE EXAM, COLONOSCOPY and PHYSICAL    Action needed:   Patient needs office visit for DIABETES FOLLOW UP. and Patient needs referral/order: EYE EXAM    Type of outreach:    Phone, left message for patient to call back. I did not leave a message as I just outreached 2 weeks ago.     Questions for provider review:    None                                                                                                                                     Brendan Simpson CMA.       Chart routed to none.

## 2019-07-31 ENCOUNTER — TELEPHONE (OUTPATIENT)
Dept: FAMILY MEDICINE | Facility: CLINIC | Age: 64
End: 2019-07-31

## 2019-07-31 NOTE — TELEPHONE ENCOUNTER
Panel Management Review      Patient has the following on his problem list: None      Composite cancer screening  Chart review shows that this patient is due/due soon for the following Colonoscopy  Summary:    Patient is due/failing the following:   COLONOSCOPY    Action needed:   Patient needs colonoscopy.    Type of outreach:    Sent letter.    Questions for provider review:    None                                                                                                                                    Ranjan Choi MA     Chart routed to none.

## 2019-07-31 NOTE — LETTER
July 31, 2019      Juan Castillo Jr.  [address]  [facility] [address]        Dear Juan,    In order to ensure we are providing the best quality care, we have reviewed your chart and see that you are due for:    1. Colonoscopy     Please call the clinic at your earliest convenience to schedule an appointment.  If you have completed these please contact our office via phone or [email or web address] to update our records.  We would like to know the date (approximately month and year), the result, and ideally where the procedure was performed.    Thank you for trusting us with your health care.      Sincerely,    Care Team for Vijay Villanueva MD